# Patient Record
Sex: FEMALE | Race: BLACK OR AFRICAN AMERICAN | NOT HISPANIC OR LATINO
[De-identification: names, ages, dates, MRNs, and addresses within clinical notes are randomized per-mention and may not be internally consistent; named-entity substitution may affect disease eponyms.]

---

## 2017-05-23 ENCOUNTER — APPOINTMENT (OUTPATIENT)
Dept: GASTROENTEROLOGY | Facility: CLINIC | Age: 57
End: 2017-05-23

## 2017-05-23 VITALS
BODY MASS INDEX: 35.99 KG/M2 | WEIGHT: 216 LBS | RESPIRATION RATE: 14 BRPM | HEART RATE: 72 BPM | DIASTOLIC BLOOD PRESSURE: 88 MMHG | HEIGHT: 65 IN | OXYGEN SATURATION: 98 % | SYSTOLIC BLOOD PRESSURE: 160 MMHG | TEMPERATURE: 97.7 F

## 2017-05-23 DIAGNOSIS — R93.8 ABNORMAL FINDINGS ON DIAGNOSTIC IMAGING OF OTHER SPECIFIED BODY STRUCTURES: ICD-10-CM

## 2017-06-07 ENCOUNTER — RESULT REVIEW (OUTPATIENT)
Age: 57
End: 2017-06-07

## 2017-06-07 ENCOUNTER — OUTPATIENT (OUTPATIENT)
Dept: OUTPATIENT SERVICES | Facility: HOSPITAL | Age: 57
LOS: 1 days | Discharge: HOME | End: 2017-06-07
Payer: COMMERCIAL

## 2017-06-07 ENCOUNTER — APPOINTMENT (OUTPATIENT)
Dept: GASTROENTEROLOGY | Facility: HOSPITAL | Age: 57
End: 2017-06-07

## 2017-06-07 PROCEDURE — 43262 ENDO CHOLANGIOPANCREATOGRAPH: CPT | Mod: 59

## 2017-06-07 PROCEDURE — 43261 ENDO CHOLANGIOPANCREATOGRAPH: CPT

## 2017-06-07 PROCEDURE — 43274 ERCP DUCT STENT PLACEMENT: CPT

## 2017-06-07 PROCEDURE — 88104 CYTOPATH FL NONGYN SMEARS: CPT

## 2017-06-07 PROCEDURE — C1769: CPT

## 2017-06-07 PROCEDURE — 88305 TISSUE EXAM BY PATHOLOGIST: CPT

## 2017-06-07 PROCEDURE — C2617: CPT

## 2017-06-07 RX ORDER — INDOMETHACIN 50 MG
100 CAPSULE ORAL ONCE
Qty: 0 | Refills: 0 | Status: DISCONTINUED | OUTPATIENT
Start: 2017-06-07 | End: 2017-06-07

## 2017-06-09 LAB
NON-GYNECOLOGICAL CYTOLOGY STUDY: SIGNIFICANT CHANGE UP
SURGICAL PATHOLOGY STUDY: SIGNIFICANT CHANGE UP

## 2017-06-12 ENCOUNTER — EMERGENCY (EMERGENCY)
Facility: HOSPITAL | Age: 57
LOS: 1 days | Discharge: HOME | End: 2017-06-12
Attending: EMERGENCY MEDICINE | Admitting: EMERGENCY MEDICINE
Payer: COMMERCIAL

## 2017-06-12 VITALS
DIASTOLIC BLOOD PRESSURE: 65 MMHG | OXYGEN SATURATION: 98 % | HEART RATE: 82 BPM | SYSTOLIC BLOOD PRESSURE: 106 MMHG | RESPIRATION RATE: 18 BRPM | TEMPERATURE: 97 F

## 2017-06-12 VITALS
DIASTOLIC BLOOD PRESSURE: 86 MMHG | OXYGEN SATURATION: 97 % | RESPIRATION RATE: 16 BRPM | SYSTOLIC BLOOD PRESSURE: 122 MMHG | WEIGHT: 204.59 LBS | HEART RATE: 71 BPM | TEMPERATURE: 98 F

## 2017-06-12 DIAGNOSIS — R10.11 RIGHT UPPER QUADRANT PAIN: ICD-10-CM

## 2017-06-12 PROCEDURE — 74177 CT ABD & PELVIS W/CONTRAST: CPT

## 2017-06-12 PROCEDURE — 83690 ASSAY OF LIPASE: CPT

## 2017-06-12 PROCEDURE — 96374 THER/PROPH/DIAG INJ IV PUSH: CPT | Mod: XU

## 2017-06-12 PROCEDURE — 99284 EMERGENCY DEPT VISIT MOD MDM: CPT | Mod: 25

## 2017-06-12 PROCEDURE — 85025 COMPLETE CBC W/AUTO DIFF WBC: CPT

## 2017-06-12 PROCEDURE — 99285 EMERGENCY DEPT VISIT HI MDM: CPT

## 2017-06-12 PROCEDURE — 80053 COMPREHEN METABOLIC PANEL: CPT

## 2017-06-12 PROCEDURE — 83605 ASSAY OF LACTIC ACID: CPT

## 2017-06-12 PROCEDURE — 74177 CT ABD & PELVIS W/CONTRAST: CPT | Mod: 26

## 2017-06-12 RX ORDER — SODIUM CHLORIDE 9 MG/ML
1000 INJECTION INTRAMUSCULAR; INTRAVENOUS; SUBCUTANEOUS ONCE
Qty: 0 | Refills: 0 | Status: COMPLETED | OUTPATIENT
Start: 2017-06-12 | End: 2017-06-12

## 2017-06-12 RX ORDER — HYDROMORPHONE HYDROCHLORIDE 2 MG/ML
1 INJECTION INTRAMUSCULAR; INTRAVENOUS; SUBCUTANEOUS ONCE
Qty: 0 | Refills: 0 | Status: DISCONTINUED | OUTPATIENT
Start: 2017-06-12 | End: 2017-06-12

## 2017-06-12 RX ADMIN — SODIUM CHLORIDE 3000 MILLILITER(S): 9 INJECTION INTRAMUSCULAR; INTRAVENOUS; SUBCUTANEOUS at 12:44

## 2017-06-12 RX ADMIN — HYDROMORPHONE HYDROCHLORIDE 1 MILLIGRAM(S): 2 INJECTION INTRAMUSCULAR; INTRAVENOUS; SUBCUTANEOUS at 12:57

## 2017-06-12 RX ADMIN — HYDROMORPHONE HYDROCHLORIDE 1 MILLIGRAM(S): 2 INJECTION INTRAMUSCULAR; INTRAVENOUS; SUBCUTANEOUS at 12:59

## 2017-06-12 NOTE — CONSULT NOTE ADULT - ASSESSMENT
56 year old female s/p ERCP with stent placement on 6/8 presents with persistent abdominal pain. Pain unlikely 2/2 to pancreatitis given location and normal lipase. More likely due to stent stretching on stricture causing colicky pain.  Imaging studies pending.     -Lipase WNL   -Cytology/ pathology results appreciated  -IVF  -Diet as tolerated  -Anti-emetics and pain control as per primary team  -Follow up CT results.     GI following

## 2017-06-12 NOTE — ED ADULT TRIAGE NOTE - CHIEF COMPLAINT QUOTE
Pt w hx of abnormal MRCP and CBD stricture, s/p ERCP w stent plcmt on 6/7/17 c/p epigastric pain, nausea, decreased po intake, chills, denies fevers or vomiting. EKG obtained.

## 2017-06-12 NOTE — ED PROVIDER NOTE - MEDICAL DECISION MAKING DETAILS
PT with RUQ pain worsening since recent biliary stent placement.  Plan labs, CT, pain control and r/o complications.  SPoke with GI fellow will come see patient.

## 2017-06-12 NOTE — ED PROVIDER NOTE - PROGRESS NOTE DETAILS
ct results noted.  pt reassessed.  belly pain improved and belly soft minimal ttp RUQ.  spoke with gi fellow who spoke with dr. harrell who believes pain is expected post stent pain.  do not suspect pe, pna.  plan dc and outpt fu.

## 2017-06-12 NOTE — ED PROVIDER NOTE - OBJECTIVE STATEMENT
55 yo F presenting with RUQ pain persistent, sharp, nonradiating and worsening since recent biliary stent placed via ERCP last Wed.  Pt denies fever, chills, n/v/d, chest pain, cough or pleurisy.

## 2017-06-12 NOTE — ED ADULT NURSE NOTE - OBJECTIVE STATEMENT
55 y/o female w/ hx of biliary stents placed at [facility] this Wednesday 6/7 endoscopically c/o RUQ pain since the surgery. Patient reports tenderness and constant pain, worsening with PO intake and movement. Patient reports she has not been able to eat or had a BM since this surgery. Patient denies fevers, n/v/d. Patient not placed on abx after surgery. Denies abd distention.

## 2017-06-12 NOTE — CONSULT NOTE ADULT - SUBJECTIVE AND OBJECTIVE BOX
HPI: 56 year old female s/p ERCP 6/8 for distal CBD stricture s/p stent placement returns to ER for persistent abdominal pain. Pt states pain never resolved post procedurally and worsening in intensity over the weekend. Pain is a cramping aching pain in the RUQ, that is worse with meals. Pt denies denies vomiting but intermittent nausea. Was prescribed tramadol which did not alleviate pain. She denies decreased appetite, change in BMs, fevers or chills.       PAST MEDICAL & SURGICAL HISTORY:  -CBD stricture    MEDICATIONS  (PRN):      Allergies    No Known Allergies    Intolerances        SOCIAL HISTORY:    FAMILY HISTORY:      Vital Signs Last 24 Hrs  T(C): 36.8, Max: 36.8 (06-12 @ 12:07)  T(F): 98.2, Max: 98.2 (06-12 @ 12:07)  HR: 69 (69 - 71)  BP: 166/79 (122/86 - 166/79)  BP(mean): --  RR: 18 (16 - 18)  SpO2: 98% (97% - 98%)    PHYSICAL EXAM:    GEN: AOx3, NAD, answering questions approrpiately  HEENT: anicteric  CHEST: equal chest rise b/l  CVS: no m/r/g  ABD: obese, soft, TTP in RUQ (+moctez[place]) although distractable.         LABS:                        13.1   4.7   )-----------( 238      ( 12 Jun 2017 11:49 )             40.1     06-12    140  |  100  |  14  ----------------------------<  98  4.1   |  23  |  0.80    Ca    10.3      12 Jun 2017 11:49    TPro  7.8  /  Alb  4.2  /  TBili  0.7  /  DBili  x   /  AST  17  /  ALT  25  /  AlkPhos  59  06-12          RADIOLOGY & ADDITIONAL STUDIES:

## 2017-06-14 DIAGNOSIS — K83.1 OBSTRUCTION OF BILE DUCT: ICD-10-CM

## 2017-06-14 DIAGNOSIS — E66.9 OBESITY, UNSPECIFIED: ICD-10-CM

## 2017-08-08 ENCOUNTER — APPOINTMENT (OUTPATIENT)
Dept: GASTROENTEROLOGY | Facility: CLINIC | Age: 57
End: 2017-08-08
Payer: COMMERCIAL

## 2017-08-08 VITALS
WEIGHT: 203 LBS | TEMPERATURE: 98.1 F | HEIGHT: 65 IN | BODY MASS INDEX: 33.82 KG/M2 | SYSTOLIC BLOOD PRESSURE: 160 MMHG | HEART RATE: 58 BPM | RESPIRATION RATE: 12 BRPM | DIASTOLIC BLOOD PRESSURE: 90 MMHG | OXYGEN SATURATION: 98 %

## 2017-08-08 PROCEDURE — 99214 OFFICE O/P EST MOD 30 MIN: CPT

## 2017-08-21 ENCOUNTER — APPOINTMENT (OUTPATIENT)
Dept: GASTROENTEROLOGY | Facility: HOSPITAL | Age: 57
End: 2017-08-21

## 2017-09-25 ENCOUNTER — RESULT REVIEW (OUTPATIENT)
Age: 57
End: 2017-09-25

## 2017-09-25 ENCOUNTER — APPOINTMENT (OUTPATIENT)
Dept: GASTROENTEROLOGY | Facility: HOSPITAL | Age: 57
End: 2017-09-25

## 2017-09-25 ENCOUNTER — OUTPATIENT (OUTPATIENT)
Dept: OUTPATIENT SERVICES | Facility: HOSPITAL | Age: 57
LOS: 1 days | Discharge: HOME | End: 2017-09-25
Payer: COMMERCIAL

## 2017-09-25 PROCEDURE — 43261 ENDO CHOLANGIOPANCREATOGRAPH: CPT

## 2017-09-25 PROCEDURE — 43264 ERCP REMOVE DUCT CALCULI: CPT

## 2017-09-25 PROCEDURE — 43275 ERCP REMOVE FORGN BODY DUCT: CPT

## 2017-09-26 ENCOUNTER — RESULT REVIEW (OUTPATIENT)
Age: 57
End: 2017-09-26

## 2017-09-26 LAB — SURGICAL PATHOLOGY STUDY: SIGNIFICANT CHANGE UP

## 2017-09-26 PROCEDURE — 43261 ENDO CHOLANGIOPANCREATOGRAPH: CPT

## 2017-09-26 PROCEDURE — 88300 SURGICAL PATH GROSS: CPT

## 2017-09-26 PROCEDURE — C1889: CPT

## 2017-09-26 PROCEDURE — C1769: CPT

## 2017-09-26 PROCEDURE — 88305 TISSUE EXAM BY PATHOLOGIST: CPT

## 2017-09-26 PROCEDURE — 74328 X-RAY BILE DUCT ENDOSCOPY: CPT

## 2017-09-26 PROCEDURE — 43264 ERCP REMOVE DUCT CALCULI: CPT

## 2017-09-26 PROCEDURE — 88112 CYTOPATH CELL ENHANCE TECH: CPT

## 2017-09-26 PROCEDURE — 43275 ERCP REMOVE FORGN BODY DUCT: CPT

## 2017-09-27 LAB — SURGICAL PATHOLOGY STUDY: SIGNIFICANT CHANGE UP

## 2017-09-28 LAB — NON-GYNECOLOGICAL CYTOLOGY STUDY: SIGNIFICANT CHANGE UP

## 2017-09-29 DIAGNOSIS — Z72.0 TOBACCO USE: ICD-10-CM

## 2017-09-29 DIAGNOSIS — E66.9 OBESITY, UNSPECIFIED: ICD-10-CM

## 2017-09-29 DIAGNOSIS — K80.33 CALCULUS OF BILE DUCT WITH ACUTE CHOLANGITIS WITH OBSTRUCTION: ICD-10-CM

## 2018-05-22 ENCOUNTER — LABORATORY RESULT (OUTPATIENT)
Age: 58
End: 2018-05-22

## 2018-05-22 ENCOUNTER — APPOINTMENT (OUTPATIENT)
Dept: GASTROENTEROLOGY | Facility: CLINIC | Age: 58
End: 2018-05-22
Payer: COMMERCIAL

## 2018-05-22 VITALS
DIASTOLIC BLOOD PRESSURE: 90 MMHG | HEIGHT: 65 IN | WEIGHT: 211 LBS | TEMPERATURE: 97.7 F | BODY MASS INDEX: 35.16 KG/M2 | HEART RATE: 70 BPM | OXYGEN SATURATION: 99 % | RESPIRATION RATE: 14 BRPM | SYSTOLIC BLOOD PRESSURE: 143 MMHG

## 2018-05-22 PROCEDURE — 99215 OFFICE O/P EST HI 40 MIN: CPT | Mod: 25

## 2018-05-22 PROCEDURE — 36415 COLL VENOUS BLD VENIPUNCTURE: CPT

## 2018-05-23 LAB
ALBUMIN SERPL ELPH-MCNC: 4.2 G/DL
ALP BLD-CCNC: 62 U/L
ALT SERPL-CCNC: 29 U/L
ANION GAP SERPL CALC-SCNC: 14 MMOL/L
AST SERPL-CCNC: 26 U/L
BILIRUB SERPL-MCNC: 0.4 MG/DL
BUN SERPL-MCNC: 15 MG/DL
CALCIUM SERPL-MCNC: 10.3 MG/DL
CHLORIDE SERPL-SCNC: 105 MMOL/L
CO2 SERPL-SCNC: 25 MMOL/L
CREAT SERPL-MCNC: 1.02 MG/DL
GLUCOSE SERPL-MCNC: 79 MG/DL
POTASSIUM SERPL-SCNC: 4.4 MMOL/L
PROT SERPL-MCNC: 7.6 G/DL
SODIUM SERPL-SCNC: 144 MMOL/L

## 2019-04-02 ENCOUNTER — APPOINTMENT (OUTPATIENT)
Dept: GASTROENTEROLOGY | Facility: CLINIC | Age: 59
End: 2019-04-02
Payer: COMMERCIAL

## 2019-04-02 VITALS
HEART RATE: 69 BPM | RESPIRATION RATE: 14 BRPM | HEIGHT: 65 IN | BODY MASS INDEX: 36.49 KG/M2 | WEIGHT: 219 LBS | SYSTOLIC BLOOD PRESSURE: 140 MMHG | OXYGEN SATURATION: 98 % | DIASTOLIC BLOOD PRESSURE: 90 MMHG

## 2019-04-02 DIAGNOSIS — K63.89 OTHER SPECIFIED DISEASES OF INTESTINE: ICD-10-CM

## 2019-04-02 DIAGNOSIS — K83.1 OBSTRUCTION OF BILE DUCT: ICD-10-CM

## 2019-04-02 PROCEDURE — 99215 OFFICE O/P EST HI 40 MIN: CPT

## 2019-04-02 NOTE — PHYSICAL EXAM
[General Appearance - Alert] : alert [General Appearance - In No Acute Distress] : in no acute distress [Sclera] : the sclera and conjunctiva were normal [Hearing Threshold Finger Rub Not Falls] : hearing was normal [Neck Appearance] : the appearance of the neck was normal [Bowel Sounds] : normal bowel sounds [Abdomen Soft] : soft [Abdomen Tenderness] : non-tender [] : no hepato-splenomegaly [Abdomen Mass (___ Cm)] : no abdominal mass palpated [Abnormal Walk] : normal gait [Skin Color & Pigmentation] : normal skin color and pigmentation [Oriented To Time, Place, And Person] : oriented to person, place, and time [Impaired Insight] : insight and judgment were intact [Affect] : the affect was normal

## 2019-04-02 NOTE — ASSESSMENT
[FreeTextEntry1] : Ms. Rojas has recurrence of SIBO symptoms. This is not atypical. This is likely contributing to some of her difficulties with weight control. She has class II obesity and this will need to be addressed in a focused fashion. Will treat both problems simultaneously. \par \par Plan:\par \par 1. Rifaximin 550 mg twice dial for 14 days. VSL #3 to follow immediately after. \par 2. Refer to [facility]par 3. She will need a combination of anoretics and GLP2 modulators. Strongly consider endoscopic gastroplasty.

## 2019-04-02 NOTE — HISTORY OF PRESENT ILLNESS
[FreeTextEntry1] : Ms. Rojas returns to the GI clinic. Her symptoms had completely resolved following treatment for SIBO. She remained asymptomatic until one month ago. There is a complete recurrence of all of her symptoms including right sided abdominal pain, bloating and an overall sense of discomfort. Over the last year, her efforts for weight loss has not been successful and she is concerned about the health consequences.

## 2019-04-18 ENCOUNTER — APPOINTMENT (OUTPATIENT)
Dept: ENDOCRINOLOGY | Facility: CLINIC | Age: 59
End: 2019-04-18
Payer: COMMERCIAL

## 2019-04-18 VITALS — BODY MASS INDEX: 36.78 KG/M2 | WEIGHT: 221 LBS

## 2019-04-18 PROCEDURE — 97802 MEDICAL NUTRITION INDIV IN: CPT

## 2019-04-23 ENCOUNTER — APPOINTMENT (OUTPATIENT)
Dept: INTERNAL MEDICINE | Facility: CLINIC | Age: 59
End: 2019-04-23
Payer: COMMERCIAL

## 2019-04-23 PROCEDURE — 36415 COLL VENOUS BLD VENIPUNCTURE: CPT

## 2019-04-25 ENCOUNTER — APPOINTMENT (OUTPATIENT)
Dept: INTERNAL MEDICINE | Facility: CLINIC | Age: 59
End: 2019-04-25
Payer: COMMERCIAL

## 2019-04-25 ENCOUNTER — NON-APPOINTMENT (OUTPATIENT)
Age: 59
End: 2019-04-25

## 2019-04-25 VITALS
OXYGEN SATURATION: 97 % | HEART RATE: 74 BPM | WEIGHT: 219 LBS | SYSTOLIC BLOOD PRESSURE: 136 MMHG | DIASTOLIC BLOOD PRESSURE: 84 MMHG | BODY MASS INDEX: 36.49 KG/M2 | HEIGHT: 65 IN | TEMPERATURE: 97.8 F

## 2019-04-25 DIAGNOSIS — Z98.890 OTHER SPECIFIED POSTPROCEDURAL STATES: ICD-10-CM

## 2019-04-25 DIAGNOSIS — R06.00 DYSPNEA, UNSPECIFIED: ICD-10-CM

## 2019-04-25 DIAGNOSIS — R06.09 OTHER FORMS OF DYSPNEA: ICD-10-CM

## 2019-04-25 DIAGNOSIS — Z92.89 PERSONAL HISTORY OF OTHER MEDICAL TREATMENT: ICD-10-CM

## 2019-04-25 DIAGNOSIS — E66.09 OTHER OBESITY DUE TO EXCESS CALORIES: ICD-10-CM

## 2019-04-25 DIAGNOSIS — Z80.9 FAMILY HISTORY OF MALIGNANT NEOPLASM, UNSPECIFIED: ICD-10-CM

## 2019-04-25 DIAGNOSIS — Z83.1 FAMILY HISTORY OF OTHER INFECTIOUS AND PARASITIC DISEASES: ICD-10-CM

## 2019-04-25 DIAGNOSIS — Z87.891 PERSONAL HISTORY OF NICOTINE DEPENDENCE: ICD-10-CM

## 2019-04-25 LAB
25(OH)D3 SERPL-MCNC: 12 NG/ML
ALBUMIN SERPL ELPH-MCNC: 4.4 G/DL
ALP BLD-CCNC: 63 U/L
ALT SERPL-CCNC: 28 U/L
ANION GAP SERPL CALC-SCNC: 12 MMOL/L
APPEARANCE: CLEAR
AST SERPL-CCNC: 22 U/L
BASOPHILS # BLD AUTO: 0.02 K/UL
BASOPHILS NFR BLD AUTO: 0.4 %
BILIRUB SERPL-MCNC: 0.4 MG/DL
BILIRUBIN URINE: NEGATIVE
BLOOD URINE: NEGATIVE
BUN SERPL-MCNC: 11 MG/DL
CALCIUM SERPL-MCNC: 9.6 MG/DL
CHLORIDE SERPL-SCNC: 106 MMOL/L
CHOLEST SERPL-MCNC: 228 MG/DL
CHOLEST/HDLC SERPL: 3.6 RATIO
CO2 SERPL-SCNC: 26 MMOL/L
COLOR: YELLOW
CREAT SERPL-MCNC: 0.74 MG/DL
CYTOLOGY CVX/VAG DOC THIN PREP: NORMAL
EOSINOPHIL # BLD AUTO: 0.07 K/UL
EOSINOPHIL NFR BLD AUTO: 1.6 %
ESTIMATED AVERAGE GLUCOSE: 126 MG/DL
GLUCOSE QUALITATIVE U: NEGATIVE
GLUCOSE SERPL-MCNC: 102 MG/DL
HBA1C MFR BLD HPLC: 6 %
HCT VFR BLD CALC: 38.9 %
HCV AB SER QL: NONREACTIVE
HCV S/CO RATIO: 0.09 S/CO
HDLC SERPL-MCNC: 64 MG/DL
HGB BLD-MCNC: 11.8 G/DL
IMM GRANULOCYTES NFR BLD AUTO: 0.2 %
KETONES URINE: NEGATIVE
LDLC SERPL CALC-MCNC: 148 MG/DL
LEUKOCYTE ESTERASE URINE: NEGATIVE
LYMPHOCYTES # BLD AUTO: 1.43 K/UL
LYMPHOCYTES NFR BLD AUTO: 31.9 %
MAN DIFF?: NORMAL
MCHC RBC-ENTMCNC: 27.5 PG
MCHC RBC-ENTMCNC: 30.3 GM/DL
MCV RBC AUTO: 90.7 FL
MONOCYTES # BLD AUTO: 0.36 K/UL
MONOCYTES NFR BLD AUTO: 8 %
NEUTROPHILS # BLD AUTO: 2.59 K/UL
NEUTROPHILS NFR BLD AUTO: 57.9 %
NITRITE URINE: NEGATIVE
PH URINE: 5
PLATELET # BLD AUTO: 239 K/UL
POTASSIUM SERPL-SCNC: 4.4 MMOL/L
PROT SERPL-MCNC: 7.3 G/DL
PROTEIN URINE: NEGATIVE
RBC # BLD: 4.29 M/UL
RBC # FLD: 14.9 %
SODIUM SERPL-SCNC: 144 MMOL/L
SPECIFIC GRAVITY URINE: 1.02
T4 FREE SERPL-MCNC: 1.2 NG/DL
TRIGL SERPL-MCNC: 80 MG/DL
TSH SERPL-ACNC: 1.33 UIU/ML
UROBILINOGEN URINE: NORMAL
WBC # FLD AUTO: 4.48 K/UL

## 2019-04-25 PROCEDURE — 99386 PREV VISIT NEW AGE 40-64: CPT | Mod: 25

## 2019-04-25 PROCEDURE — 93000 ELECTROCARDIOGRAM COMPLETE: CPT

## 2019-04-25 RX ORDER — ALBUTEROL SULFATE 90 UG/1
108 (90 BASE) AEROSOL, METERED RESPIRATORY (INHALATION)
Qty: 1 | Refills: 1 | Status: ACTIVE | COMMUNITY
Start: 2018-03-19

## 2019-04-25 RX ORDER — DICYCLOMINE HYDROCHLORIDE 10 MG/1
10 CAPSULE ORAL
Qty: 20 | Refills: 0 | Status: COMPLETED | COMMUNITY
Start: 2018-10-26

## 2019-04-25 RX ORDER — TRAMADOL HYDROCHLORIDE 50 MG/1
50 TABLET, COATED ORAL
Qty: 15 | Refills: 0 | Status: DISCONTINUED | COMMUNITY
Start: 2017-06-07 | End: 2019-04-25

## 2019-04-25 NOTE — HISTORY OF PRESENT ILLNESS
[FreeTextEntry1] : annual exam [de-identified] : HTN\par - compliant w/ norvasc 10mg daily\par - BP well controlled\par - c/o intermittent HARLEY a/w substernal pressure\par - h/o normal stress test 5yrs ago\par \par Obesity\par - follows w/ nutritionist\par - tries to diet at home\par - enjoys fried chicken and rice\par - states her issue is eating very late at night after coming home from work\par - tires to eat salads and fish for the most part\par - has never tried any rx weight loss drugs\par \par PreDM2\par - new dx for pt\par \par dyspnea\par - uses Ventolin rarely, maybe 1/month if that\par - etio of SOB not that clear.\par \par SIBO\par - pending Xifaxan approval by insurance

## 2019-04-25 NOTE — HEALTH RISK ASSESSMENT
[No falls in past year] : Patient reported no falls in the past year [0] : 2) Feeling down, depressed, or hopeless: Not at all (0) [None] : None [Single] : single [Alone] : lives alone [Employed] : employed [Fully functional (bathing, dressing, toileting, transferring, walking, feeding)] : Fully functional (bathing, dressing, toileting, transferring, walking, feeding) [Fully functional (using the telephone, shopping, preparing meals, housekeeping, doing laundry, using] : Fully functional and needs no help or supervision to perform IADLs (using the telephone, shopping, preparing meals, housekeeping, doing laundry, using transportation, managing medications and managing finances) [With Patient/Caregiver] : With Patient/Caregiver [Designated Healthcare Proxy] : Designated healthcare proxy [[id number]] : 0 [] : No [Behavior] : denies difficulty with behavior [Language] : denies difficulty with language [Change in mental status noted] : No change in mental status noted [Learning/Retaining New Information] : denies difficulty learning/retaining new information [Handling Complex Tasks] : denies difficulty handling complex tasks [Reasoning] : denies difficulty with reasoning [Spatial Ability and Orientation] : denies difficulty with spatial ability and orientation [Sexually Active] : not sexually active [Reports changes in hearing] : Reports no changes in hearing [High Risk Behavior] : no high risk behavior [Reports changes in vision] : Reports no changes in vision [AdvancecareDate] : 04/25/2019

## 2019-04-25 NOTE — PHYSICAL EXAM
[No Acute Distress] : no acute distress [Well-Appearing] : well-appearing [Well Nourished] : well nourished [Well Developed] : well developed [Normal Sclera/Conjunctiva] : normal sclera/conjunctiva [Normal Outer Ear/Nose] : the outer ears and nose were normal in appearance [EOMI] : extraocular movements intact [Supple] : supple [No Lymphadenopathy] : no lymphadenopathy [No Respiratory Distress] : no respiratory distress  [Thyroid Normal, No Nodules] : the thyroid was normal and there were no nodules present [Clear to Auscultation] : lungs were clear to auscultation bilaterally [No Accessory Muscle Use] : no accessory muscle use [Normal Rate] : normal rate  [Regular Rhythm] : with a regular rhythm [No Murmur] : no murmur heard [Normal S1, S2] : normal S1 and S2 [No Varicosities] : no varicosities [No Edema] : there was no peripheral edema [No Extremity Clubbing/Cyanosis] : no extremity clubbing/cyanosis [Soft] : abdomen soft [Non Tender] : non-tender [Non-distended] : non-distended [No Masses] : no abdominal mass palpated [No HSM] : no HSM [Normal Supraclavicular Nodes] : no supraclavicular lymphadenopathy [No CVA Tenderness] : no CVA  tenderness [Normal Anterior Cervical Nodes] : no anterior cervical lymphadenopathy [No Joint Swelling] : no joint swelling [No Spinal Tenderness] : no spinal tenderness [Normal Gait] : normal gait [No Rash] : no rash [Grossly Normal Strength/Tone] : grossly normal strength/tone [No Focal Deficits] : no focal deficits [Coordination Grossly Intact] : coordination grossly intact [Normal Mood] : the mood was normal [Normal Affect] : the affect was normal [Alert and Oriented x3] : oriented to person, place, and time [Normal Insight/Judgement] : insight and judgment were intact

## 2019-05-14 ENCOUNTER — APPOINTMENT (OUTPATIENT)
Dept: GASTROENTEROLOGY | Facility: CLINIC | Age: 59
End: 2019-05-14

## 2019-05-23 ENCOUNTER — APPOINTMENT (OUTPATIENT)
Dept: HEART AND VASCULAR | Facility: CLINIC | Age: 59
End: 2019-05-23
Payer: COMMERCIAL

## 2019-05-23 VITALS
BODY MASS INDEX: 36.99 KG/M2 | SYSTOLIC BLOOD PRESSURE: 132 MMHG | OXYGEN SATURATION: 97 % | WEIGHT: 222 LBS | DIASTOLIC BLOOD PRESSURE: 84 MMHG | HEIGHT: 65 IN | HEART RATE: 73 BPM

## 2019-05-23 DIAGNOSIS — R07.9 CHEST PAIN, UNSPECIFIED: ICD-10-CM

## 2019-05-23 PROCEDURE — 99205 OFFICE O/P NEW HI 60 MIN: CPT

## 2019-05-23 RX ORDER — AMLODIPINE BESYLATE 10 MG/1
10 TABLET ORAL DAILY
Qty: 90 | Refills: 3 | Status: DISCONTINUED | COMMUNITY
Start: 2018-03-19 | End: 2019-05-23

## 2019-05-23 NOTE — HISTORY OF PRESENT ILLNESS
[FreeTextEntry1] : \par \par 57 y/o female with h/o htn, obesity, hl, predm who presents for initial evaluation today.\par \par HTN managed on norvasc - diagnosed 5 years\par \par Notes harley and cp\par HARLEY with stairs or long walks - started 4 years ago\par Has cp with exertion - pressure, substernal, 7/10, lasts minutes\par associated with nausea\par \par walks but not actively exercising\par \par Notes h/o snoring and fatigue\par \par \par PMH/PSH:\par htn\par obesity\par predm\par hl\par vit D deficiency\par small intestinal bact. overgrowth\par ercp/sphincterotomy\par lipoma removal\par h/o TB\par \par \par \par SH:\par former tobacco < 1/2 ppd x 25 years, quit \par no tobacco/drugs (former marijuana, cocaine - last used 10 years ago - prior use 7 years)\par social etoh\par lives alone\par single\par Mormon\par works for [employer]\par 6 sons healthy\par from [place]\par \par \par ALL:\par nkda\par \par \par MEDS:\par norvasc 10 mg qd\par ventolin prn\par \par \par \par FH:\par father -  TB 50's\par mother -  cancer 62\par 12 siblings - 3 \par \par

## 2019-05-23 NOTE — PHYSICAL EXAM
[General Appearance - Well Developed] : well developed [General Appearance - Well Nourished] : well nourished [General Appearance - In No Acute Distress] : no acute distress [Normal Conjunctiva] : the conjunctiva exhibited no abnormalities [Normal Oropharynx] : normal oropharynx [Normal Jugular Venous V Waves Present] : normal jugular venous V waves present [Heart Rate And Rhythm] : heart rate and rhythm were normal [Heart Sounds] : normal S1 and S2 [Murmurs] : no murmurs present [Arterial Pulses Normal] : the arterial pulses were normal [FreeTextEntry1] : 2+ ble edema [Respiration, Rhythm And Depth] : normal respiratory rhythm and effort [Auscultation Breath Sounds / Voice Sounds] : lungs were clear to auscultation bilaterally [Bowel Sounds] : normal bowel sounds [Abdomen Soft] : soft [Abdomen Tenderness] : non-tender [Abnormal Walk] : normal gait [Nail Clubbing] : no clubbing of the fingernails [Cyanosis, Localized] : no localized cyanosis [] : no rash [Skin Lesions] : no skin lesions [Oriented To Time, Place, And Person] : oriented to person, place, and time [No Anxiety] : not feeling anxious

## 2019-05-23 NOTE — DISCUSSION/SUMMARY
[Patient] : the patient [___ Week(s)] : [unfilled] week(s) [FreeTextEntry1] : \par 59 y/o female with h/o htn, obesity, hl, predm who presents for initial evaluation today of cp/laboy\par \par -change to norvasc/benazepril given le edema on higher dose norvasc\par -counseled on cvd risk factors, diet, exercise, weight loss\par -blood sugar management for predm\par -labs 2019 reviewed\par -ekg 2019 reviewed\par -ordered CTA and Echo\par -f/up 3 weeks for bp/bmp and urine microalbumin\par

## 2019-06-13 ENCOUNTER — RX RENEWAL (OUTPATIENT)
Age: 59
End: 2019-06-13

## 2019-07-02 ENCOUNTER — APPOINTMENT (OUTPATIENT)
Dept: CT IMAGING | Facility: HOSPITAL | Age: 59
End: 2019-07-02
Payer: COMMERCIAL

## 2019-07-02 ENCOUNTER — OUTPATIENT (OUTPATIENT)
Dept: OUTPATIENT SERVICES | Facility: HOSPITAL | Age: 59
LOS: 1 days | End: 2019-07-02
Payer: COMMERCIAL

## 2019-07-02 DIAGNOSIS — K76.0 FATTY (CHANGE OF) LIVER, NOT ELSEWHERE CLASSIFIED: ICD-10-CM

## 2019-07-02 PROCEDURE — 75574 CT ANGIO HRT W/3D IMAGE: CPT

## 2019-07-02 PROCEDURE — 75574 CT ANGIO HRT W/3D IMAGE: CPT | Mod: 26

## 2019-07-08 PROBLEM — K76.0 FATTY LIVER: Status: ACTIVE | Noted: 2019-07-08

## 2019-10-08 ENCOUNTER — APPOINTMENT (OUTPATIENT)
Dept: INTERNAL MEDICINE | Facility: CLINIC | Age: 59
End: 2019-10-08
Payer: COMMERCIAL

## 2019-10-08 ENCOUNTER — TRANSCRIPTION ENCOUNTER (OUTPATIENT)
Age: 59
End: 2019-10-08

## 2019-10-08 VITALS
HEIGHT: 65 IN | OXYGEN SATURATION: 96 % | DIASTOLIC BLOOD PRESSURE: 80 MMHG | BODY MASS INDEX: 36.65 KG/M2 | HEART RATE: 91 BPM | TEMPERATURE: 97.9 F | WEIGHT: 220 LBS | SYSTOLIC BLOOD PRESSURE: 128 MMHG

## 2019-10-08 DIAGNOSIS — M25.551 PAIN IN RIGHT HIP: ICD-10-CM

## 2019-10-08 PROCEDURE — 99214 OFFICE O/P EST MOD 30 MIN: CPT | Mod: 25

## 2019-10-08 PROCEDURE — 36415 COLL VENOUS BLD VENIPUNCTURE: CPT

## 2019-10-08 RX ORDER — IBUPROFEN 800 MG/1
800 TABLET, FILM COATED ORAL EVERY 6 HOURS
Qty: 120 | Refills: 0 | Status: ACTIVE | COMMUNITY
Start: 2018-09-26 | End: 1900-01-01

## 2019-10-08 NOTE — HISTORY OF PRESENT ILLNESS
[FreeTextEntry1] : right hip pain [de-identified] : right hip pain\par - radiates to low back/buttock and anterior thigh\par - denies heavy lfiting\par - pain is constant, dull, not electric\par \par obesity\par - was unable to  contrave, was too expensive, did not know how to find coupon\par \par HTN\par - much better afer medication changed to Norvasc/benazepril\par - never followed up w/ cardio for Cr check

## 2019-10-08 NOTE — PHYSICAL EXAM
[No Acute Distress] : no acute distress [Well Developed] : well developed [Well-Appearing] : well-appearing [Well Nourished] : well nourished [No Respiratory Distress] : no respiratory distress  [No Carotid Bruits] : no carotid bruits [No Abdominal Bruit] : a ~M bruit was not heard ~T in the abdomen [No Varicosities] : no varicosities [Pedal Pulses Present] : the pedal pulses are present [No Edema] : there was no peripheral edema [No Extremity Clubbing/Cyanosis] : no extremity clubbing/cyanosis [Non Tender] : non-tender [Soft] : abdomen soft [Non-distended] : non-distended [No Masses] : no abdominal mass palpated [No HSM] : no HSM [No Rash] : no rash [No Focal Deficits] : no focal deficits [Coordination Grossly Intact] : coordination grossly intact [Normal Gait] : normal gait [Normal Affect] : the affect was normal [Normal Mood] : the mood was normal [Alert and Oriented x3] : oriented to person, place, and time [Normal Insight/Judgement] : insight and judgment were intact [Normal Sclera/Conjunctiva] : normal sclera/conjunctiva [EOMI] : extraocular movements intact [No Spinal Tenderness] : no spinal tenderness [de-identified] : right hip tenderness to deep palpation.

## 2019-10-09 ENCOUNTER — MEDICATION RENEWAL (OUTPATIENT)
Age: 59
End: 2019-10-09

## 2019-10-09 RX ORDER — ERGOCALCIFEROL 1.25 MG/1
1.25 MG CAPSULE, LIQUID FILLED ORAL
Qty: 12 | Refills: 3 | Status: ACTIVE | COMMUNITY
Start: 2019-04-25 | End: 1900-01-01

## 2019-10-17 ENCOUNTER — TRANSCRIPTION ENCOUNTER (OUTPATIENT)
Age: 59
End: 2019-10-17

## 2019-10-17 LAB
25(OH)D3 SERPL-MCNC: 15.3 NG/ML
ALBUMIN SERPL ELPH-MCNC: 4.7 G/DL
ALP BLD-CCNC: 70 U/L
ALT SERPL-CCNC: 30 U/L
ANION GAP SERPL CALC-SCNC: 14 MMOL/L
APPEARANCE: CLEAR
AST SERPL-CCNC: 23 U/L
BASOPHILS # BLD AUTO: 0.02 K/UL
BASOPHILS NFR BLD AUTO: 0.4 %
BILIRUB SERPL-MCNC: 0.5 MG/DL
BILIRUBIN URINE: NEGATIVE
BLOOD URINE: NEGATIVE
BUN SERPL-MCNC: 14 MG/DL
CALCIUM SERPL-MCNC: 10.2 MG/DL
CHLORIDE SERPL-SCNC: 104 MMOL/L
CHOLEST SERPL-MCNC: 243 MG/DL
CHOLEST/HDLC SERPL: 3.5 RATIO
CO2 SERPL-SCNC: 24 MMOL/L
COLOR: YELLOW
CREAT SERPL-MCNC: 0.75 MG/DL
EOSINOPHIL # BLD AUTO: 0.07 K/UL
EOSINOPHIL NFR BLD AUTO: 1.3 %
ESTIMATED AVERAGE GLUCOSE: 126 MG/DL
GLUCOSE QUALITATIVE U: NEGATIVE
GLUCOSE SERPL-MCNC: 145 MG/DL
HBA1C MFR BLD HPLC: 6 %
HCT VFR BLD CALC: 38.8 %
HDLC SERPL-MCNC: 70 MG/DL
HGB BLD-MCNC: 12.1 G/DL
IMM GRANULOCYTES NFR BLD AUTO: 0.6 %
KETONES URINE: NEGATIVE
LDLC SERPL CALC-MCNC: 150 MG/DL
LEUKOCYTE ESTERASE URINE: NEGATIVE
LYMPHOCYTES # BLD AUTO: 1.87 K/UL
LYMPHOCYTES NFR BLD AUTO: 35.6 %
MAN DIFF?: NORMAL
MCHC RBC-ENTMCNC: 27.6 PG
MCHC RBC-ENTMCNC: 31.2 GM/DL
MCV RBC AUTO: 88.6 FL
MONOCYTES # BLD AUTO: 0.19 K/UL
MONOCYTES NFR BLD AUTO: 3.6 %
NEUTROPHILS # BLD AUTO: 3.07 K/UL
NEUTROPHILS NFR BLD AUTO: 58.5 %
NITRITE URINE: NEGATIVE
PH URINE: 5
PLATELET # BLD AUTO: 235 K/UL
POTASSIUM SERPL-SCNC: 3.8 MMOL/L
PROT SERPL-MCNC: 7.2 G/DL
PROTEIN URINE: NEGATIVE
RBC # BLD: 4.38 M/UL
RBC # FLD: 14.4 %
SODIUM SERPL-SCNC: 142 MMOL/L
SPECIFIC GRAVITY URINE: 1.03
TRIGL SERPL-MCNC: 117 MG/DL
UROBILINOGEN URINE: NORMAL
WBC # FLD AUTO: 5.25 K/UL

## 2020-02-03 ENCOUNTER — APPOINTMENT (OUTPATIENT)
Dept: INTERNAL MEDICINE | Facility: CLINIC | Age: 60
End: 2020-02-03
Payer: COMMERCIAL

## 2020-02-03 VITALS
HEIGHT: 65 IN | SYSTOLIC BLOOD PRESSURE: 130 MMHG | OXYGEN SATURATION: 98 % | WEIGHT: 212 LBS | TEMPERATURE: 98.1 F | HEART RATE: 69 BPM | DIASTOLIC BLOOD PRESSURE: 80 MMHG | BODY MASS INDEX: 35.32 KG/M2

## 2020-02-03 DIAGNOSIS — K52.9 NONINFECTIVE GASTROENTERITIS AND COLITIS, UNSPECIFIED: ICD-10-CM

## 2020-02-03 PROCEDURE — 99214 OFFICE O/P EST MOD 30 MIN: CPT | Mod: 25

## 2020-02-03 PROCEDURE — 36415 COLL VENOUS BLD VENIPUNCTURE: CPT

## 2020-02-03 NOTE — PHYSICAL EXAM
[Normal] : affect was normal and insight and judgment were intact [de-identified] : soft, mild tenderness to deep palpation.

## 2020-02-03 NOTE — HISTORY OF PRESENT ILLNESS
[FreeTextEntry8] : diarrhea\par - 2 weeks of profuse watery diarrhea\par - previously BM every 15mins, now down to twice a day\par - no blood or mucus\par - a/w diffuse abdominal cramping and discomfort\par - feels similar to last year in april when she had SIBO and was treated by GI w/ Xifaxan\par - cramping is a/w diffuse body aches and fatigue as if "she was beat up"\par \par Obesity/DM2\par - unsure why rx for victoza was never received. \par - states she is eating healthier now.

## 2020-02-07 ENCOUNTER — TRANSCRIPTION ENCOUNTER (OUTPATIENT)
Age: 60
End: 2020-02-07

## 2020-02-07 LAB
ALBUMIN SERPL ELPH-MCNC: 4.8 G/DL
ALP BLD-CCNC: 69 U/L
ALT SERPL-CCNC: 28 U/L
AMYLASE/CREAT SERPL: 68 U/L
ANION GAP SERPL CALC-SCNC: 11 MMOL/L
AST SERPL-CCNC: 22 U/L
BASOPHILS # BLD AUTO: 0.02 K/UL
BASOPHILS NFR BLD AUTO: 0.4 %
BILIRUB SERPL-MCNC: 0.3 MG/DL
BUN SERPL-MCNC: 14 MG/DL
CALCIUM SERPL-MCNC: 9.7 MG/DL
CHLORIDE SERPL-SCNC: 109 MMOL/L
CO2 SERPL-SCNC: 26 MMOL/L
CREAT SERPL-MCNC: 0.79 MG/DL
CREAT SPEC-SCNC: 141 MG/DL
CRP SERPL-MCNC: 0.71 MG/DL
EOSINOPHIL # BLD AUTO: 0.06 K/UL
EOSINOPHIL NFR BLD AUTO: 1.2 %
ESTIMATED AVERAGE GLUCOSE: 123 MG/DL
GLUCOSE SERPL-MCNC: 86 MG/DL
HBA1C MFR BLD HPLC: 5.9 %
HCT VFR BLD CALC: 39 %
HGB BLD-MCNC: 12 G/DL
IMM GRANULOCYTES NFR BLD AUTO: 0.2 %
LPL SERPL-CCNC: 29 U/L
LYMPHOCYTES # BLD AUTO: 1.65 K/UL
LYMPHOCYTES NFR BLD AUTO: 34.1 %
MAN DIFF?: NORMAL
MCHC RBC-ENTMCNC: 27.8 PG
MCHC RBC-ENTMCNC: 30.8 GM/DL
MCV RBC AUTO: 90.3 FL
MICROALBUMIN 24H UR DL<=1MG/L-MCNC: <1.2 MG/DL
MICROALBUMIN/CREAT 24H UR-RTO: NORMAL MG/G
MONOCYTES # BLD AUTO: 0.39 K/UL
MONOCYTES NFR BLD AUTO: 8.1 %
NEUTROPHILS # BLD AUTO: 2.71 K/UL
NEUTROPHILS NFR BLD AUTO: 56 %
PLATELET # BLD AUTO: 232 K/UL
POTASSIUM SERPL-SCNC: 3.9 MMOL/L
PROT SERPL-MCNC: 7.3 G/DL
RBC # BLD: 4.32 M/UL
RBC # FLD: 14.6 %
SODIUM SERPL-SCNC: 146 MMOL/L
WBC # FLD AUTO: 4.84 K/UL

## 2020-02-10 ENCOUNTER — TRANSCRIPTION ENCOUNTER (OUTPATIENT)
Age: 60
End: 2020-02-10

## 2020-02-10 RX ORDER — NALTREXONE HYDROCHLORIDE AND BUPROPION HYDROCHLORIDE 8; 90 MG/1; MG/1
8-90 TABLET, EXTENDED RELEASE ORAL
Qty: 120 | Refills: 1 | Status: DISCONTINUED | COMMUNITY
Start: 2019-04-25 | End: 2020-02-10

## 2020-02-13 ENCOUNTER — TRANSCRIPTION ENCOUNTER (OUTPATIENT)
Age: 60
End: 2020-02-13

## 2020-03-19 ENCOUNTER — RX RENEWAL (OUTPATIENT)
Age: 60
End: 2020-03-19

## 2020-04-13 ENCOUNTER — TRANSCRIPTION ENCOUNTER (OUTPATIENT)
Age: 60
End: 2020-04-13

## 2020-04-14 ENCOUNTER — RX RENEWAL (OUTPATIENT)
Age: 60
End: 2020-04-14

## 2020-04-15 ENCOUNTER — APPOINTMENT (OUTPATIENT)
Dept: INTERNAL MEDICINE | Facility: CLINIC | Age: 60
End: 2020-04-15
Payer: COMMERCIAL

## 2020-04-15 ENCOUNTER — LABORATORY RESULT (OUTPATIENT)
Age: 60
End: 2020-04-15

## 2020-04-15 VITALS — DIASTOLIC BLOOD PRESSURE: 80 MMHG | SYSTOLIC BLOOD PRESSURE: 120 MMHG | TEMPERATURE: 100.4 F

## 2020-04-15 DIAGNOSIS — M79.10 MYALGIA, UNSPECIFIED SITE: ICD-10-CM

## 2020-04-15 DIAGNOSIS — R50.9 FEVER, UNSPECIFIED: ICD-10-CM

## 2020-04-15 DIAGNOSIS — R10.9 UNSPECIFIED ABDOMINAL PAIN: ICD-10-CM

## 2020-04-15 PROCEDURE — 99214 OFFICE O/P EST MOD 30 MIN: CPT | Mod: 25

## 2020-04-15 PROCEDURE — 36415 COLL VENOUS BLD VENIPUNCTURE: CPT

## 2020-04-15 RX ORDER — RIFAXIMIN 550 MG/1
550 TABLET ORAL
Qty: 28 | Refills: 0 | Status: COMPLETED | COMMUNITY
Start: 2019-04-02 | End: 2020-04-15

## 2020-04-15 NOTE — HISTORY OF PRESENT ILLNESS
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

## 2020-04-15 NOTE — PHYSICAL EXAM
[Normal] : affect was normal and insight and judgment were intact [de-identified] : mild right flank tenderness [de-identified] : mild right CVAT

## 2020-04-16 LAB
ALBUMIN SERPL ELPH-MCNC: 4.9 G/DL
ALP BLD-CCNC: 61 U/L
ALT SERPL-CCNC: 27 U/L
ANION GAP SERPL CALC-SCNC: 16 MMOL/L
APPEARANCE: ABNORMAL
AST SERPL-CCNC: 28 U/L
BASOPHILS # BLD AUTO: 0.01 K/UL
BASOPHILS NFR BLD AUTO: 0.2 %
BILIRUB SERPL-MCNC: 0.5 MG/DL
BILIRUBIN URINE: NEGATIVE
BLOOD URINE: NORMAL
BUN SERPL-MCNC: 11 MG/DL
CALCIUM SERPL-MCNC: 9.3 MG/DL
CHLORIDE SERPL-SCNC: 104 MMOL/L
CK SERPL-CCNC: 317 U/L
CO2 SERPL-SCNC: 23 MMOL/L
COLOR: YELLOW
CREAT SERPL-MCNC: 0.9 MG/DL
CRP SERPL-MCNC: 0.77 MG/DL
DEPRECATED D DIMER PPP IA-ACNC: 181 NG/ML DDU
EOSINOPHIL # BLD AUTO: 0 K/UL
EOSINOPHIL NFR BLD AUTO: 0 %
ERYTHROCYTE [SEDIMENTATION RATE] IN BLOOD BY WESTERGREN METHOD: 57 MM/HR
GLUCOSE QUALITATIVE U: NEGATIVE
GLUCOSE SERPL-MCNC: 117 MG/DL
HCT VFR BLD CALC: 41.6 %
HGB BLD-MCNC: 12.7 G/DL
IMM GRANULOCYTES NFR BLD AUTO: 0.2 %
KETONES URINE: NEGATIVE
LEUKOCYTE ESTERASE URINE: NEGATIVE
LYMPHOCYTES # BLD AUTO: 1.35 K/UL
LYMPHOCYTES NFR BLD AUTO: 28.9 %
MAGNESIUM SERPL-MCNC: 1.9 MG/DL
MAN DIFF?: NORMAL
MCHC RBC-ENTMCNC: 26.7 PG
MCHC RBC-ENTMCNC: 30.5 GM/DL
MCV RBC AUTO: 87.4 FL
MONOCYTES # BLD AUTO: 0.31 K/UL
MONOCYTES NFR BLD AUTO: 6.6 %
NEUTROPHILS # BLD AUTO: 2.99 K/UL
NEUTROPHILS NFR BLD AUTO: 64.1 %
NITRITE URINE: NEGATIVE
PH URINE: 5.5
PLATELET # BLD AUTO: 201 K/UL
POTASSIUM SERPL-SCNC: 3.8 MMOL/L
PROT SERPL-MCNC: 7.7 G/DL
PROTEIN URINE: ABNORMAL
RBC # BLD: 4.76 M/UL
RBC # FLD: 14.4 %
SODIUM SERPL-SCNC: 143 MMOL/L
SPECIFIC GRAVITY URINE: 1.03
UROBILINOGEN URINE: NORMAL
WBC # FLD AUTO: 4.67 K/UL

## 2020-07-06 ENCOUNTER — TRANSCRIPTION ENCOUNTER (OUTPATIENT)
Age: 60
End: 2020-07-06

## 2020-07-07 ENCOUNTER — TRANSCRIPTION ENCOUNTER (OUTPATIENT)
Age: 60
End: 2020-07-07

## 2020-12-31 PROBLEM — K63.89 SMALL INTESTINAL BACTERIAL OVERGROWTH: Status: ACTIVE | Noted: 2019-04-02

## 2021-05-18 ENCOUNTER — NON-APPOINTMENT (OUTPATIENT)
Age: 61
End: 2021-05-18

## 2021-05-18 ENCOUNTER — APPOINTMENT (OUTPATIENT)
Dept: INTERNAL MEDICINE | Facility: CLINIC | Age: 61
End: 2021-05-18
Payer: COMMERCIAL

## 2021-05-18 VITALS
HEART RATE: 65 BPM | SYSTOLIC BLOOD PRESSURE: 139 MMHG | OXYGEN SATURATION: 97 % | TEMPERATURE: 98.4 F | WEIGHT: 217 LBS | BODY MASS INDEX: 36.11 KG/M2 | DIASTOLIC BLOOD PRESSURE: 84 MMHG

## 2021-05-18 DIAGNOSIS — I10 ESSENTIAL (PRIMARY) HYPERTENSION: ICD-10-CM

## 2021-05-18 DIAGNOSIS — R73.03 PREDIABETES.: ICD-10-CM

## 2021-05-18 DIAGNOSIS — E78.5 HYPERLIPIDEMIA, UNSPECIFIED: ICD-10-CM

## 2021-05-18 DIAGNOSIS — R51.9 HEADACHE, UNSPECIFIED: ICD-10-CM

## 2021-05-18 DIAGNOSIS — Z00.00 ENCOUNTER FOR GENERAL ADULT MEDICAL EXAMINATION W/OUT ABNORMAL FINDINGS: ICD-10-CM

## 2021-05-18 DIAGNOSIS — E55.9 VITAMIN D DEFICIENCY, UNSPECIFIED: ICD-10-CM

## 2021-05-18 DIAGNOSIS — R14.0 ABDOMINAL DISTENSION (GASEOUS): ICD-10-CM

## 2021-05-18 DIAGNOSIS — E11.9 TYPE 2 DIABETES MELLITUS W/OUT COMPLICATIONS: ICD-10-CM

## 2021-05-18 PROCEDURE — 93000 ELECTROCARDIOGRAM COMPLETE: CPT

## 2021-05-18 PROCEDURE — 99072 ADDL SUPL MATRL&STAF TM PHE: CPT

## 2021-05-18 PROCEDURE — 36415 COLL VENOUS BLD VENIPUNCTURE: CPT

## 2021-05-18 PROCEDURE — 99396 PREV VISIT EST AGE 40-64: CPT | Mod: 25

## 2021-05-18 RX ORDER — PEN NEEDLE, DIABETIC 29 G X1/2"
32G X 4 MM NEEDLE, DISPOSABLE MISCELLANEOUS
Qty: 1 | Refills: 3 | Status: ACTIVE | COMMUNITY
Start: 2020-02-13

## 2021-05-18 RX ORDER — ATORVASTATIN CALCIUM 40 MG/1
40 TABLET, FILM COATED ORAL
Qty: 90 | Refills: 3 | Status: DISCONTINUED | COMMUNITY
Start: 2020-02-10 | End: 2021-05-18

## 2021-05-18 NOTE — HISTORY OF PRESENT ILLNESS
[FreeTextEntry1] : annual exam [de-identified] : annual\par - last visit over 1 year ago\par - missed b/c of covid pandemic\par \par HA\par - chronic issue since 4/2020\par - constant dull\par - mildly improved w/ OTC Tylenol\par - never had MRI brain for eval\par \par HTN\par - compliant w/ amlodipine/benazapril 5/10mg daily\par \par DM2\par - has not been on any medication for months\par \par HCM\par - needs mammo\par -

## 2021-05-18 NOTE — PHYSICAL EXAM
[No Acute Distress] : no acute distress [Well Nourished] : well nourished [Well Developed] : well developed [Well-Appearing] : well-appearing [Normal] : no acute distress, well nourished, well developed and well-appearing [Normal Sclera/Conjunctiva] : normal sclera/conjunctiva [EOMI] : extraocular movements intact [Normal Outer Ear/Nose] : the outer ears and nose were normal in appearance [No Respiratory Distress] : no respiratory distress  [No Accessory Muscle Use] : no accessory muscle use [Clear to Auscultation] : lungs were clear to auscultation bilaterally [Normal Rate] : normal rate  [Regular Rhythm] : with a regular rhythm [Normal S1, S2] : normal S1 and S2 [No Murmur] : no murmur heard [No Edema] : there was no peripheral edema [No Extremity Clubbing/Cyanosis] : no extremity clubbing/cyanosis [Soft] : abdomen soft [Non Tender] : non-tender [Non-distended] : non-distended [No Masses] : no abdominal mass palpated [No HSM] : no HSM [No Joint Swelling] : no joint swelling [Grossly Normal Strength/Tone] : grossly normal strength/tone [No Rash] : no rash [Coordination Grossly Intact] : coordination grossly intact [No Focal Deficits] : no focal deficits [Normal Gait] : normal gait [Normal Affect] : the affect was normal [Alert and Oriented x3] : oriented to person, place, and time [Normal Mood] : the mood was normal [Normal Insight/Judgement] : insight and judgment were intact

## 2021-06-10 LAB
25(OH)D3 SERPL-MCNC: 26.7 NG/ML
ALBUMIN SERPL ELPH-MCNC: 4.7 G/DL
ALP BLD-CCNC: 67 U/L
ALT SERPL-CCNC: 28 U/L
ANION GAP SERPL CALC-SCNC: 14 MMOL/L
APPEARANCE: CLEAR
AST SERPL-CCNC: 21 U/L
BASOPHILS # BLD AUTO: 0.01 K/UL
BASOPHILS NFR BLD AUTO: 0.2 %
BILIRUB SERPL-MCNC: 0.5 MG/DL
BILIRUBIN URINE: NEGATIVE
BLOOD URINE: NEGATIVE
BUN SERPL-MCNC: 14 MG/DL
CALCIUM SERPL-MCNC: 9.9 MG/DL
CHLORIDE SERPL-SCNC: 105 MMOL/L
CHOLEST SERPL-MCNC: 220 MG/DL
CK SERPL-CCNC: 297 U/L
CO2 SERPL-SCNC: 22 MMOL/L
COLOR: NORMAL
CREAT SERPL-MCNC: 0.78 MG/DL
EOSINOPHIL # BLD AUTO: 0.03 K/UL
EOSINOPHIL NFR BLD AUTO: 0.6 %
ESTIMATED AVERAGE GLUCOSE: 128 MG/DL
GLUCOSE QUALITATIVE U: NEGATIVE
GLUCOSE SERPL-MCNC: 84 MG/DL
HBA1C MFR BLD HPLC: 6.1 %
HCT VFR BLD CALC: 37.8 %
HDLC SERPL-MCNC: 67 MG/DL
HGB BLD-MCNC: 11.2 G/DL
IMM GRANULOCYTES NFR BLD AUTO: 0.4 %
KETONES URINE: NEGATIVE
LDLC SERPL CALC-MCNC: 138 MG/DL
LEUKOCYTE ESTERASE URINE: NEGATIVE
LYMPHOCYTES # BLD AUTO: 1.78 K/UL
LYMPHOCYTES NFR BLD AUTO: 36.3 %
MAGNESIUM SERPL-MCNC: 2.3 MG/DL
MAN DIFF?: NORMAL
MCHC RBC-ENTMCNC: 26.9 PG
MCHC RBC-ENTMCNC: 29.6 GM/DL
MCV RBC AUTO: 90.9 FL
MONOCYTES # BLD AUTO: 0.42 K/UL
MONOCYTES NFR BLD AUTO: 8.6 %
NEUTROPHILS # BLD AUTO: 2.65 K/UL
NEUTROPHILS NFR BLD AUTO: 53.9 %
NITRITE URINE: NEGATIVE
NONHDLC SERPL-MCNC: 153 MG/DL
PH URINE: 5.5
PLATELET # BLD AUTO: 235 K/UL
POTASSIUM SERPL-SCNC: 4.2 MMOL/L
PROT SERPL-MCNC: 7.5 G/DL
PROTEIN URINE: NEGATIVE
RBC # BLD: 4.16 M/UL
RBC # FLD: 15.1 %
SODIUM SERPL-SCNC: 141 MMOL/L
SPECIFIC GRAVITY URINE: 1.02
TRIGL SERPL-MCNC: 77 MG/DL
TSH SERPL-ACNC: 1.1 UIU/ML
UROBILINOGEN URINE: NORMAL
WBC # FLD AUTO: 4.91 K/UL

## 2021-06-25 ENCOUNTER — NON-APPOINTMENT (OUTPATIENT)
Age: 61
End: 2021-06-25

## 2021-08-17 ENCOUNTER — RX RENEWAL (OUTPATIENT)
Age: 61
End: 2021-08-17

## 2021-09-21 ENCOUNTER — RX RENEWAL (OUTPATIENT)
Age: 61
End: 2021-09-21

## 2021-10-25 ENCOUNTER — TRANSCRIPTION ENCOUNTER (OUTPATIENT)
Age: 61
End: 2021-10-25

## 2021-10-25 RX ORDER — AMLODIPINE BESYLATE AND BENAZEPRIL HYDROCHLORIDE 5; 10 MG/1; MG/1
5-10 CAPSULE ORAL
Qty: 30 | Refills: 6 | Status: ACTIVE | COMMUNITY
Start: 2019-05-23 | End: 1900-01-01

## 2021-11-11 ENCOUNTER — RX RENEWAL (OUTPATIENT)
Age: 61
End: 2021-11-11

## 2021-12-20 ENCOUNTER — RX RENEWAL (OUTPATIENT)
Age: 61
End: 2021-12-20

## 2021-12-20 RX ORDER — LIRAGLUTIDE 6 MG/ML
18 INJECTION SUBCUTANEOUS DAILY
Qty: 6 | Refills: 4 | Status: ACTIVE | COMMUNITY
Start: 2019-10-17 | End: 1900-01-01

## 2022-01-05 ENCOUNTER — LABORATORY RESULT (OUTPATIENT)
Age: 62
End: 2022-01-05

## 2022-01-05 ENCOUNTER — APPOINTMENT (OUTPATIENT)
Dept: GASTROENTEROLOGY | Facility: CLINIC | Age: 62
End: 2022-01-05
Payer: COMMERCIAL

## 2022-01-05 VITALS
HEIGHT: 65 IN | RESPIRATION RATE: 15 BRPM | HEART RATE: 82 BPM | OXYGEN SATURATION: 98 % | SYSTOLIC BLOOD PRESSURE: 140 MMHG | WEIGHT: 224 LBS | TEMPERATURE: 97.6 F | BODY MASS INDEX: 37.32 KG/M2 | DIASTOLIC BLOOD PRESSURE: 80 MMHG

## 2022-01-05 PROCEDURE — 36415 COLL VENOUS BLD VENIPUNCTURE: CPT

## 2022-01-05 PROCEDURE — 99215 OFFICE O/P EST HI 40 MIN: CPT | Mod: 25

## 2022-01-05 PROCEDURE — 99205 OFFICE O/P NEW HI 60 MIN: CPT | Mod: 25

## 2022-01-06 ENCOUNTER — TRANSCRIPTION ENCOUNTER (OUTPATIENT)
Age: 62
End: 2022-01-06

## 2022-01-06 LAB
25(OH)D3 SERPL-MCNC: 22.8 NG/ML
ALBUMIN SERPL ELPH-MCNC: 4.7 G/DL
ALP BLD-CCNC: 68 U/L
ALT SERPL-CCNC: 30 U/L
ANA SER IF-ACNC: NEGATIVE
ANION GAP SERPL CALC-SCNC: 13 MMOL/L
AST SERPL-CCNC: 23 U/L
BASOPHILS # BLD AUTO: 0.02 K/UL
BASOPHILS NFR BLD AUTO: 0.4 %
BILIRUB DIRECT SERPL-MCNC: 0.1 MG/DL
BILIRUB SERPL-MCNC: 0.4 MG/DL
BUN SERPL-MCNC: 12 MG/DL
CALCIUM SERPL-MCNC: 9.5 MG/DL
CHLORIDE SERPL-SCNC: 106 MMOL/L
CO2 SERPL-SCNC: 24 MMOL/L
CREAT SERPL-MCNC: 0.87 MG/DL
EOSINOPHIL # BLD AUTO: 0.04 K/UL
EOSINOPHIL NFR BLD AUTO: 0.9 %
GLUCOSE SERPL-MCNC: 120 MG/DL
HCT VFR BLD CALC: 38.3 %
HGB BLD-MCNC: 11.7 G/DL
IMM GRANULOCYTES NFR BLD AUTO: 0.2 %
INR PPP: 0.96 RATIO
LPL SERPL-CCNC: 34 U/L
LYMPHOCYTES # BLD AUTO: 1.54 K/UL
LYMPHOCYTES NFR BLD AUTO: 34.3 %
MAN DIFF?: NORMAL
MCHC RBC-ENTMCNC: 27.3 PG
MCHC RBC-ENTMCNC: 30.5 GM/DL
MCV RBC AUTO: 89.5 FL
MONOCYTES # BLD AUTO: 0.25 K/UL
MONOCYTES NFR BLD AUTO: 5.6 %
NEUTROPHILS # BLD AUTO: 2.63 K/UL
NEUTROPHILS NFR BLD AUTO: 58.6 %
PLATELET # BLD AUTO: 218 K/UL
POTASSIUM SERPL-SCNC: 4 MMOL/L
PROT SERPL-MCNC: 7.4 G/DL
PT BLD: 11.4 SEC
RBC # BLD: 4.28 M/UL
RBC # FLD: 14.1 %
SODIUM SERPL-SCNC: 143 MMOL/L
WBC # FLD AUTO: 4.49 K/UL

## 2022-01-07 LAB
CELIACPAN: NORMAL
MITOCHONDRIA AB SER IF-ACNC: NORMAL

## 2022-01-13 NOTE — HISTORY OF PRESENT ILLNESS
[de-identified] : 60 yo F PMH HTN, DM, history of an ERCP with CBD stenting for benign CBD stricture with Dr. Gutierrez in 2017 who presents for evaluation of nausea, abdominal cramping and symptoms similar to when she required an ERCP in 2017. \par \par Patient lost her son recently She has 4 other sons\par \par She had ERCP with CBD stent for bening CBD stricture with Dr. Gutierrez in 2017. Symptoms resolved after the procedure\par She now has symptoms that are very similar to symptoms in 2017.\par \par Had a colonoscopy a while ago. Was told there was diverticulosis, but patient does not think there was any evidence of inflammation/IBD. Does think there may have been polyps. \par \par She thinks she also may have had an endoscopy.\par Is due for one March 2022.  \par \par Has nausea after eating. Sometimes will have nausea even if she has not eaten. \par She is also having SOB which was one of the main symptoms previously \par She feels like food stops in epigastric area when she eats\par She has pain in her stomach. It is mostly crampy sensation. She gets shooting pains at times. \par Has not noticed it is with certain foods. \par Symptoms do wake her at night. \par Sometimes happens even if not eaten. \par Has not lost weight. \par Nausea is the most bothersome symptom. \par Food gets to throat and she gags, that seem to come out of nowhere. \par \par No changes in bowel habits. Sometimes when has a BM nausea improves. \par No jaundice, scleral icterus, pruritis. \par \par Used to have acid reflux, not having those symptoms now as much. \par \par PRIOR PROCEDURES:\par Sept 25 2017: ERCP with stent removal, balloon stone extraction: Distal CBD stricture-improved, choledocholithiasis\par \par June 2017: ERCP with biopsy, sphinterotomy, papillotomy, stent placement\par \par May 2017 colonoscopoy: Dr. Mast\par small internal hemorrhoids, mild sigmoid diverticulosis, otherwise normal colonoscopy\par f/u in 5 yrs \par \par 7/15/2011 EGD\par gastritis without bleeding\par \par 7/15/2011: Colonoscopy: \par 3mm transverse polyp remvoed by cold biopsy, 4mm polyp in the transverse colon removed by cold biopsy, small internal hemorrhoids\par \par PMH: \par HTN\par DM \par \par PSH: \par ERCP with Dr. Gutierrez\par lipoma in LUQ that was removed\par \par FH: \par mother had gallbladder issue\par heart disease\par No GI malignancies that she is aware of\par \par SH: \par in the past smoked cigarettes, has not smoked for years\par occasional EtOH \par no drug use\par \par MEDS: \par amlodipine\par vitamin D \par potassium\par magnesium\par \par ALL: \par NKDA \par

## 2022-01-13 NOTE — ASSESSMENT
[FreeTextEntry1] : 62 yo F PMH HTN, DM, history of an ERCP with CBD stenting for benign CBD stricture with Dr. Gutierrez in 2017 who presents for evaluation of nausea, abdominal cramping and symptoms similar to when she required an ERCP in 2017. \par \par Nausea, abdominal cramping, history of CBD stricture\par - bloodwork including LFTs \par - check AMA and ANGELA \par - MRCP for evaluation particularly given history of benign CBD stricture\par - referral to Dr. Doyle for possible ERCP\par - plan for EGD to evaluate nausea and abdominal pain to r/o PUD, esophagitis, gastritis, GOO (has a history of gastritis)\par - reviewed chart and prior colonoscopy records (had diverticulosis and hemorrhoids, no polyps) \par - currently seeing a PMD at [facility] \par - spent time reviewing records that patient brings with her to clinic\par \par Colonoscopy for personal history of polyps, due for a colonoscopy per prior colonoscopy report that was reviewed\par - will schedule for a colonoscopy\par - reviewed r/b/a/i of the procedures including but not limited to bleeding, missing an abnormal finding, perforation\par - reviewed bowel preparation instructions in detail\par - needs adult escort the day of the procedure\par - may need COVID-19 testing within 3 days of procedure\par - preprocedure labs today\par \par Follow up post-procedure

## 2022-01-17 LAB — MENADIONE SERPL-MCNC: 0.85 NG/ML

## 2022-01-18 LAB — VIT A SERPL-MCNC: 32 UG/DL

## 2022-01-27 ENCOUNTER — APPOINTMENT (OUTPATIENT)
Dept: GASTROENTEROLOGY | Facility: CLINIC | Age: 62
End: 2022-01-27
Payer: COMMERCIAL

## 2022-01-27 ENCOUNTER — RESULT REVIEW (OUTPATIENT)
Age: 62
End: 2022-01-27

## 2022-01-27 PROCEDURE — 45380 COLONOSCOPY AND BIOPSY: CPT

## 2022-01-27 PROCEDURE — 43239 EGD BIOPSY SINGLE/MULTIPLE: CPT

## 2022-02-01 ENCOUNTER — TRANSCRIPTION ENCOUNTER (OUTPATIENT)
Age: 62
End: 2022-02-01

## 2022-10-01 NOTE — PHYSICAL EXAM
-- DO NOT REPLY / DO NOT REPLY ALL --  -- Message is from [facility]agement Center [employer] ([facility]) --    ONLY TO BE USED WITHIN A REFILL MEDICATION ENCOUNTER    Med Refill  Is the patient currently having Emergent symptoms?: No    Name of medication requested: ibuprofen (MOTRIN) 600 MG tablet    Has patient contacted the pharmacy? No, direct patient to contact pharmacy first as they will be able to process the refill request directly    Is this the first request for the medication in the last 48 hours?: Yes    Patient is requesting a medication refill - medication is on active medication list    Patient is currently OUT of the requested medication - sent as HIGH priority      Full name of the provider who ordered the medication[facility] site name / Account # for provider: [place]    Preferred Pharmacy: Pharmacy  [id number] [place] [address] At [address] [place] [address]    Patient confirmed the above pharmacy as correct?  Yes      Caller Information       Type Contact Phone/Fax    10/01/2022 11:44 AM CDT Phone (Incoming) Ciara Goddard (Self) [phone] (M)          Alternative phone number: [phone]    Can a detailed message be left?: Yes    Patient is completely out of medication: verify if patient is currently experiencing symptoms. If patient is symptomatic, proceed with front end triage instead of medication refill. If patient is not symptomatic but is completely out of medication, rhianna as High priority when routing. Inform patient: “Please call back with any questions or concerns and if your condition becomes life threatening, you should seek immediate medical assistance by calling 911 or going to the Emergency Department for evaluation.”    Inform all patients: \"Please be aware the return phone call may come from an unidentified or out of state phone number and your refill request will be addressed as soon as the clinical team reviews your message.\"   [General Appearance - Alert] : alert [General Appearance - In No Acute Distress] : in no acute distress [Sclera] : the sclera and conjunctiva were normal [PERRL With Normal Accommodation] : pupils were equal in size, round, and reactive to light [Extraocular Movements] : extraocular movements were intact [Outer Ear] : the ears and nose were normal in appearance [Oropharynx] : the oropharynx was normal [Neck Appearance] : the appearance of the neck was normal [Neck Cervical Mass (___cm)] : no neck mass was observed [Jugular Venous Distention Increased] : there was no jugular-venous distention [Thyroid Diffuse Enlargement] : the thyroid was not enlarged [Thyroid Nodule] : there were no palpable thyroid nodules [Auscultation Breath Sounds / Voice Sounds] : lungs were clear to auscultation bilaterally [Heart Rate And Rhythm] : heart rate was normal and rhythm regular [Heart Sounds] : normal S1 and S2 [Heart Sounds Gallop] : no gallops [Murmurs] : no murmurs [Heart Sounds Pericardial Friction Rub] : no pericardial rub [Bowel Sounds] : normal bowel sounds [Abdomen Soft] : soft [Abdomen Tenderness] : non-tender [Abdomen Mass (___ Cm)] : no abdominal mass palpated [No CVA Tenderness] : no ~M costovertebral angle tenderness [No Spinal Tenderness] : no spinal tenderness [Abnormal Walk] : normal gait [Nail Clubbing] : no clubbing  or cyanosis of the fingernails [Musculoskeletal - Swelling] : no joint swelling seen [Motor Tone] : muscle strength and tone were normal [Skin Color & Pigmentation] : normal skin color and pigmentation [Skin Turgor] : normal skin turgor [] : no rash [Oriented To Time, Place, And Person] : oriented to person, place, and time [Impaired Insight] : insight and judgment were intact [Affect] : the affect was normal

## 2022-11-30 ENCOUNTER — INPATIENT (INPATIENT)
Facility: HOSPITAL | Age: 62
LOS: 4 days | Discharge: HOME | DRG: 417 | End: 2022-12-05
Attending: SURGERY | Admitting: SURGERY
Payer: COMMERCIAL

## 2022-11-30 VITALS
HEART RATE: 64 BPM | TEMPERATURE: 98 F | OXYGEN SATURATION: 97 % | HEIGHT: 65 IN | DIASTOLIC BLOOD PRESSURE: 94 MMHG | WEIGHT: 225.09 LBS | RESPIRATION RATE: 18 BRPM | SYSTOLIC BLOOD PRESSURE: 192 MMHG

## 2022-11-30 DIAGNOSIS — E78.5 HYPERLIPIDEMIA, UNSPECIFIED: ICD-10-CM

## 2022-11-30 DIAGNOSIS — R63.8 OTHER SYMPTOMS AND SIGNS CONCERNING FOOD AND FLUID INTAKE: ICD-10-CM

## 2022-11-30 DIAGNOSIS — R10.9 UNSPECIFIED ABDOMINAL PAIN: ICD-10-CM

## 2022-11-30 DIAGNOSIS — I10 ESSENTIAL (PRIMARY) HYPERTENSION: ICD-10-CM

## 2022-11-30 LAB
ALBUMIN SERPL ELPH-MCNC: 4.4 G/DL — SIGNIFICANT CHANGE UP (ref 3.3–5)
ALP SERPL-CCNC: 76 U/L — SIGNIFICANT CHANGE UP (ref 40–120)
ALT FLD-CCNC: 29 U/L — SIGNIFICANT CHANGE UP (ref 10–45)
ANION GAP SERPL CALC-SCNC: 11 MMOL/L — SIGNIFICANT CHANGE UP (ref 5–17)
APPEARANCE UR: ABNORMAL
APTT BLD: 29.1 SEC — SIGNIFICANT CHANGE UP (ref 27.5–35.5)
AST SERPL-CCNC: 22 U/L — SIGNIFICANT CHANGE UP (ref 10–40)
BASOPHILS # BLD AUTO: 0.01 K/UL — SIGNIFICANT CHANGE UP (ref 0–0.2)
BASOPHILS NFR BLD AUTO: 0.1 % — SIGNIFICANT CHANGE UP (ref 0–2)
BILIRUB SERPL-MCNC: 0.3 MG/DL — SIGNIFICANT CHANGE UP (ref 0.2–1.2)
BILIRUB UR-MCNC: NEGATIVE — SIGNIFICANT CHANGE UP
BUN SERPL-MCNC: 13 MG/DL — SIGNIFICANT CHANGE UP (ref 7–23)
CALCIUM SERPL-MCNC: 9.3 MG/DL — SIGNIFICANT CHANGE UP (ref 8.4–10.5)
CHLORIDE SERPL-SCNC: 102 MMOL/L — SIGNIFICANT CHANGE UP (ref 96–108)
CO2 SERPL-SCNC: 27 MMOL/L — SIGNIFICANT CHANGE UP (ref 22–31)
COLOR SPEC: YELLOW — SIGNIFICANT CHANGE UP
CREAT SERPL-MCNC: 0.84 MG/DL — SIGNIFICANT CHANGE UP (ref 0.5–1.3)
DIFF PNL FLD: NEGATIVE — SIGNIFICANT CHANGE UP
EGFR: 79 ML/MIN/1.73M2 — SIGNIFICANT CHANGE UP
EOSINOPHIL # BLD AUTO: 0.03 K/UL — SIGNIFICANT CHANGE UP (ref 0–0.5)
EOSINOPHIL NFR BLD AUTO: 0.4 % — SIGNIFICANT CHANGE UP (ref 0–6)
GLUCOSE SERPL-MCNC: 173 MG/DL — HIGH (ref 70–99)
GLUCOSE UR QL: NEGATIVE — SIGNIFICANT CHANGE UP
HCT VFR BLD CALC: 37.1 % — SIGNIFICANT CHANGE UP (ref 34.5–45)
HGB BLD-MCNC: 11.5 G/DL — SIGNIFICANT CHANGE UP (ref 11.5–15.5)
IMM GRANULOCYTES NFR BLD AUTO: 0.7 % — SIGNIFICANT CHANGE UP (ref 0–0.9)
INR BLD: 1.04 — SIGNIFICANT CHANGE UP (ref 0.88–1.16)
KETONES UR-MCNC: NEGATIVE — SIGNIFICANT CHANGE UP
LACTATE SERPL-SCNC: 1.5 MMOL/L — SIGNIFICANT CHANGE UP (ref 0.5–2)
LEUKOCYTE ESTERASE UR-ACNC: NEGATIVE — SIGNIFICANT CHANGE UP
LIDOCAIN IGE QN: 33 U/L — SIGNIFICANT CHANGE UP (ref 7–60)
LYMPHOCYTES # BLD AUTO: 1.18 K/UL — SIGNIFICANT CHANGE UP (ref 1–3.3)
LYMPHOCYTES # BLD AUTO: 17.2 % — SIGNIFICANT CHANGE UP (ref 13–44)
MCHC RBC-ENTMCNC: 27.6 PG — SIGNIFICANT CHANGE UP (ref 27–34)
MCHC RBC-ENTMCNC: 31 GM/DL — LOW (ref 32–36)
MCV RBC AUTO: 89 FL — SIGNIFICANT CHANGE UP (ref 80–100)
MONOCYTES # BLD AUTO: 0.41 K/UL — SIGNIFICANT CHANGE UP (ref 0–0.9)
MONOCYTES NFR BLD AUTO: 6 % — SIGNIFICANT CHANGE UP (ref 2–14)
NEUTROPHILS # BLD AUTO: 5.17 K/UL — SIGNIFICANT CHANGE UP (ref 1.8–7.4)
NEUTROPHILS NFR BLD AUTO: 75.6 % — SIGNIFICANT CHANGE UP (ref 43–77)
NITRITE UR-MCNC: NEGATIVE — SIGNIFICANT CHANGE UP
NRBC # BLD: 0 /100 WBCS — SIGNIFICANT CHANGE UP (ref 0–0)
PH UR: 8 — SIGNIFICANT CHANGE UP (ref 5–8)
PLATELET # BLD AUTO: 226 K/UL — SIGNIFICANT CHANGE UP (ref 150–400)
POTASSIUM SERPL-MCNC: 3.7 MMOL/L — SIGNIFICANT CHANGE UP (ref 3.5–5.3)
POTASSIUM SERPL-SCNC: 3.7 MMOL/L — SIGNIFICANT CHANGE UP (ref 3.5–5.3)
PROT SERPL-MCNC: 7.4 G/DL — SIGNIFICANT CHANGE UP (ref 6–8.3)
PROT UR-MCNC: NEGATIVE MG/DL — SIGNIFICANT CHANGE UP
PROTHROM AB SERPL-ACNC: 12.4 SEC — SIGNIFICANT CHANGE UP (ref 10.5–13.4)
RBC # BLD: 4.17 M/UL — SIGNIFICANT CHANGE UP (ref 3.8–5.2)
RBC # FLD: 15.1 % — HIGH (ref 10.3–14.5)
SARS-COV-2 RNA SPEC QL NAA+PROBE: NEGATIVE — SIGNIFICANT CHANGE UP
SODIUM SERPL-SCNC: 140 MMOL/L — SIGNIFICANT CHANGE UP (ref 135–145)
SP GR SPEC: 1.01 — SIGNIFICANT CHANGE UP (ref 1–1.03)
UROBILINOGEN FLD QL: 0.2 E.U./DL — SIGNIFICANT CHANGE UP
WBC # BLD: 6.85 K/UL — SIGNIFICANT CHANGE UP (ref 3.8–10.5)
WBC # FLD AUTO: 6.85 K/UL — SIGNIFICANT CHANGE UP (ref 3.8–10.5)

## 2022-11-30 PROCEDURE — 99222 1ST HOSP IP/OBS MODERATE 55: CPT

## 2022-11-30 PROCEDURE — 99285 EMERGENCY DEPT VISIT HI MDM: CPT

## 2022-11-30 PROCEDURE — 71045 X-RAY EXAM CHEST 1 VIEW: CPT | Mod: 26

## 2022-11-30 PROCEDURE — 74177 CT ABD & PELVIS W/CONTRAST: CPT | Mod: 26,MA

## 2022-11-30 RX ORDER — MORPHINE SULFATE 50 MG/1
8 CAPSULE, EXTENDED RELEASE ORAL ONCE
Refills: 0 | Status: DISCONTINUED | OUTPATIENT
Start: 2022-11-30 | End: 2022-11-30

## 2022-11-30 RX ORDER — AMLODIPINE BESYLATE 2.5 MG/1
5 TABLET ORAL AT BEDTIME
Refills: 0 | Status: DISCONTINUED | OUTPATIENT
Start: 2022-11-30 | End: 2022-12-05

## 2022-11-30 RX ORDER — MORPHINE SULFATE 50 MG/1
4 CAPSULE, EXTENDED RELEASE ORAL EVERY 4 HOURS
Refills: 0 | Status: DISCONTINUED | OUTPATIENT
Start: 2022-12-01 | End: 2022-12-03

## 2022-11-30 RX ORDER — ENOXAPARIN SODIUM 100 MG/ML
40 INJECTION SUBCUTANEOUS EVERY 24 HOURS
Refills: 0 | Status: DISCONTINUED | OUTPATIENT
Start: 2022-11-30 | End: 2022-12-03

## 2022-11-30 RX ORDER — ONDANSETRON 8 MG/1
4 TABLET, FILM COATED ORAL ONCE
Refills: 0 | Status: COMPLETED | OUTPATIENT
Start: 2022-11-30 | End: 2022-11-30

## 2022-11-30 RX ORDER — ROSUVASTATIN CALCIUM 5 MG/1
0 TABLET ORAL
Qty: 0 | Refills: 5 | DISCHARGE

## 2022-11-30 RX ORDER — SODIUM CHLORIDE 9 MG/ML
1000 INJECTION INTRAMUSCULAR; INTRAVENOUS; SUBCUTANEOUS ONCE
Refills: 0 | Status: COMPLETED | OUTPATIENT
Start: 2022-11-30 | End: 2022-11-30

## 2022-11-30 RX ORDER — KETOROLAC TROMETHAMINE 30 MG/ML
30 SYRINGE (ML) INJECTION EVERY 8 HOURS
Refills: 0 | Status: DISCONTINUED | OUTPATIENT
Start: 2022-11-30 | End: 2022-12-01

## 2022-11-30 RX ORDER — AMLODIPINE BESYLATE AND BENAZEPRIL HYDROCHLORIDE 10; 20 MG/1; MG/1
0 CAPSULE ORAL
Qty: 0 | Refills: 1 | DISCHARGE

## 2022-11-30 RX ORDER — MORPHINE SULFATE 50 MG/1
4 CAPSULE, EXTENDED RELEASE ORAL ONCE
Refills: 0 | Status: DISCONTINUED | OUTPATIENT
Start: 2022-11-30 | End: 2022-11-30

## 2022-11-30 RX ORDER — ATORVASTATIN CALCIUM 80 MG/1
40 TABLET, FILM COATED ORAL AT BEDTIME
Refills: 0 | Status: DISCONTINUED | OUTPATIENT
Start: 2022-11-30 | End: 2022-12-05

## 2022-11-30 RX ORDER — MORPHINE SULFATE 50 MG/1
4 CAPSULE, EXTENDED RELEASE ORAL EVERY 4 HOURS
Refills: 0 | Status: DISCONTINUED | OUTPATIENT
Start: 2022-11-30 | End: 2022-11-30

## 2022-11-30 RX ORDER — LISINOPRIL 2.5 MG/1
10 TABLET ORAL DAILY
Refills: 0 | Status: DISCONTINUED | OUTPATIENT
Start: 2022-11-30 | End: 2022-12-05

## 2022-11-30 RX ADMIN — MORPHINE SULFATE 8 MILLIGRAM(S): 50 CAPSULE, EXTENDED RELEASE ORAL at 21:29

## 2022-11-30 RX ADMIN — AMLODIPINE BESYLATE 5 MILLIGRAM(S): 2.5 TABLET ORAL at 22:36

## 2022-11-30 RX ADMIN — MORPHINE SULFATE 4 MILLIGRAM(S): 50 CAPSULE, EXTENDED RELEASE ORAL at 17:26

## 2022-11-30 RX ADMIN — SODIUM CHLORIDE 1000 MILLILITER(S): 9 INJECTION INTRAMUSCULAR; INTRAVENOUS; SUBCUTANEOUS at 17:26

## 2022-11-30 RX ADMIN — MORPHINE SULFATE 4 MILLIGRAM(S): 50 CAPSULE, EXTENDED RELEASE ORAL at 21:54

## 2022-11-30 RX ADMIN — MORPHINE SULFATE 8 MILLIGRAM(S): 50 CAPSULE, EXTENDED RELEASE ORAL at 18:57

## 2022-11-30 RX ADMIN — ATORVASTATIN CALCIUM 40 MILLIGRAM(S): 80 TABLET, FILM COATED ORAL at 22:36

## 2022-11-30 RX ADMIN — ONDANSETRON 4 MILLIGRAM(S): 8 TABLET, FILM COATED ORAL at 17:26

## 2022-11-30 NOTE — H&P ADULT - HISTORY OF PRESENT ILLNESS
HPI  61 y/o F with PMHx distal CBD stricture s/p stent placement on 6/8/2017 (on amlodipine) and HLD (on rosuvastatin) presents to ED c/o epigastric and right sided abdominal pain that began suddenly at 2:30pm today while pt was at work. Pain radiates to lower abdomen and to her back. Also with associated mild SOB, nausea and vomiting. Pt currently c/o feeling feverish with chills. Denies cough, congestion, diarrhea, hematuria, or rash. Last BM today.    In ED patient received 4mg x2 morphine and 8mg x 1 morphine. GI consulted who rec MRCP new multiple nondependent densities in   gallbladder, polyps affective adherent sludge. Worsening CBD dilation since prior exam.  HPI  61 y/o F with PMHx distal CBD stricture s/p stent placement on 6/8/2017, HTN, and HLD (on rosuvastatin) presents to ED c/o epigastric and right sided abdominal pain that began suddenly at 2:30pm today while pt was at work. Pain radiates to lower abdomen and to her back. Also with associated mild SOB, nausea and vomiting. Pt currently c/o feeling feverish with chills. Denies cough, congestion, diarrhea, hematuria, or rash. Last BM today.    In ED patient received 4mg x2 morphine and 8mg x 1 morphine. GI consulted who rec MRCP new multiple nondependent densities in   gallbladder, polyps affective adherent sludge. Worsening CBD dilation since prior exam.

## 2022-11-30 NOTE — ED PROVIDER NOTE - PROGRESS NOTE DETAILS
1. Since June 12, 2017, new multiple nondependent densities in   gallbladder, polyps or [place] affective adherent sludge. New prominent   gastrohepatic ligament lymph node. Recommend MRI without and with venous   contrast for further evaluation.  No acute cholecystitis.  2. No intrahepatic biliary dilatation. Increased common bile duct   dilatation, post stent removal.  3. No suspicious liver lesion. Severe hepatic steatosis.    pt states she is still in pain will order more morphine. d/w general surgery who recommends medicine admission as there are no gallstones. GI recommends mrcp and mr liver with and without contrast and they will see pt in ED d/w ilana who agrees with plan

## 2022-11-30 NOTE — H&P ADULT - PROBLEM/PLAN-4
DISPLAY PLAN FREE TEXT LABS:                        8.8<L>  8.10  )-----------( 395      ( 02 Nov 2018 14:01 )             28.9<L>    140    |  102    |  11     ----------------------------<  101<H>    02 Nov 2018 14:01  4.4     |  33<H>  |  0.60     Ca 8.7           02 Nov 2018 14:01    TPro  5.5<L>  /  Alb  1.9<L>  /  TBili  0.4    /  DBili  x      /  AST  22     /  ALT  14     /  AlkPhos  97     02 Nov 2018 14:01    PT/INR - ( 02 Nov 2018 14:01 )   PT: 12.3 ;   INR: 1.12         PTT - ( 02 Nov 2018 14:01 )  PTT:27.5     EKG:  sinus tachycardia with flattened TW in III, aVL, aVF  Radiology:  < from: CT Abdomen and Pelvis w/ Oral Cont (11.02.18 @ 16:38) >      Minimal effusion right lung base. Coronary artery calcifications present.    Massive intra-abdominal and intrapelvic ascites present. Hypodensity in   the right hepatic lobe unchanged prior exam have the appearance of a   lobulated cyst. The spleen is not enlarged. Splenic calcified granulomas   present. Atrophic pancreas. Poor definition of kidneys and adrenal glands   due to the massive ascites. No aortic aneurysm. Soft tissue densities in   the retroperitoneal region poorly defined due to lack of IV contrast and    the massive ascites, representing adenopathy. No biliary ductal   dilatation.    No bowel obstruction. Orally ingested contrast filled small bowel but has   not yet reached the large bowel. Pelvic sidewall adenopathy, presacral   adenopathy, these findings are   poorly defined due to lack of IV   contrast and the massive ascites.    Urinary bladder is poorly defined. Inguinal regions intact. No acute   appearing osseous abnormalities.    IMPRESSION:    Massive intra-abdominal and intrapelvic ascites. See above report.    < end of copied text >

## 2022-11-30 NOTE — ED PROVIDER NOTE - OBJECTIVE STATEMENT
63 y/o F with PMHx distal CBD stricture s/p stent placement on 6/8/2017 (on amlodipine) and HLD (on rosuvastatin) presents to ED c/o epigastric and right sided abdominal pain that began suddenly at 2:30pm today while pt was at work. Pain radiates to lower abdomen and to her back. Also with associated mild SOB, nausea and vomiting. Pt currently c/o feeling feverish with chills. Denies cough, congestion, diarrhea, hematuria, or rash. Last BM today.

## 2022-11-30 NOTE — ED ADULT TRIAGE NOTE - CHIEF COMPLAINT QUOTE
Pt c/o midsternal chest pain and tightness that started at 3pm today. +SOB. Reports 1 episode of vomiting.

## 2022-11-30 NOTE — H&P ADULT - PROBLEM SELECTOR PLAN 1
Patient with RUQ, epigastric, periumbillical pain and CT findings with gallbladder slude and worsening CBD dilation. No hx of fatty meal today and patient ate a salad before the pain began. Has received 12 IV morphine in ED with mild relief. GI consulted and rec further imaging and will see patient   -f/u MRCP   -pain control Patient with RUQ, epigastric, periumbillical pain and CT findings with gallbladder slude and worsening CBD dilation. No hx of fatty meal today and patient ate a salad before the pain began. Has received 12 IV morphine in ED with mild relief. GI consulted and rec further imaging and will see patient   -f/u MRCP   -pain control with standing toradol and PRN morphine for breakthrough

## 2022-11-30 NOTE — H&P ADULT - ASSESSMENT
63 y/o F with PMHx distal CBD stricture s/p stent placement on 6/8/2017 (on amlodipine) and HLD (on rosuvastatin) presents to ED c/o epigastric and right sided abdominal pain that began suddenly at 2:30pm today while pt was at work found to have gallbladder sludge and worsening CBD dilation

## 2022-11-30 NOTE — H&P ADULT - NSHPPHYSICALEXAM_GEN_ALL_CORE
Vital Signs Last 12 Hrs  T(F): 99.1 (11-30-22 @ 19:38), Max: 99.1 (11-30-22 @ 19:38)  HR: 77 (11-30-22 @ 19:38) (64 - 77)  BP: 180/96 (11-30-22 @ 19:38) (180/96 - 192/94)  BP(mean): --  RR: 18 (11-30-22 @ 19:38) (18 - 18)  SpO2: 95% (11-30-22 @ 19:38) (95% - 97%)    PHYSICAL EXAM:  Constitutional: NAD, comfortable in bed.  HEENT: NC/AT, PERRLA, EOMI, no conjunctival pallor or scleral icterus, MMM  Neck: Supple, no JVD  Respiratory: CTA B/L. No w/r/r.   Cardiovascular: RRR, normal S1 and S2, no m/r/g.   Gastrointestinal: +BS, TTP over epigastric area, RUQ, periumbillical region. no rebound, guarding  Extremities: wwp; no cyanosis, clubbing or edema.   Vascular: Pulses equal and strong throughout.   Neurological: AAOx3, no focal deficits  Skin: No gross skin abnormalities or rashes

## 2022-11-30 NOTE — H&P ADULT - NSHPLABSRESULTS_GEN_ALL_CORE
LABS:                         11.5   6.85  )-----------( 226      ( 2022 17:25 )             37.1         140  |  102  |  13  ----------------------------<  173<H>  3.7   |  27  |  0.84    Ca    9.3      2022 17:25    TPro  7.4  /  Alb  4.4  /  TBili  0.3  /  DBili  x   /  AST  22  /  ALT  29  /  AlkPhos  76      PT/INR - ( 2022 17:25 )   PT: 12.4 sec;   INR: 1.04          PTT - ( 2022 17:25 )  PTT:29.1 sec  Urinalysis Basic - ( 2022 17:00 )    Color: Yellow / Appearance: Cloudy / S.015 / pH: x  Gluc: x / Ketone: NEGATIVE  / Bili: Negative / Urobili: 0.2 E.U./dL   Blood: x / Protein: NEGATIVE mg/dL / Nitrite: NEGATIVE   Leuk Esterase: NEGATIVE / RBC: x / WBC x   Sq Epi: x / Non Sq Epi: x / Bacteria: x            Lactate, Blood: 1.5 mmol/L ( @ 17:25)      RADIOLOGY, EKG & ADDITIONAL TESTS:

## 2022-11-30 NOTE — ED PROVIDER NOTE - PHYSICAL EXAMINATION
GENERAL: Uncomforable appearing, well nourished, awake, alert and in moderate distress. Unable to sit still in stretcher.   ENMT: Airway patent,   EYES: conjunctiva clear  CARDIAC: Regular rate, regular rhythm.  Heart sounds S1, S2, no S3, S4. No murmurs, rubs or gallops.  RESPIRATORY: Breath sounds clear and equal in bilateral anterior lung fields, no wheezes/ronchi/stridor; pt breathing and speaking comfortably with no increased WOB, no accessory mm. use, no intercostal retractions, no nasal flaring  GI: abdomen soft, non-distended, +tenderness to palpation in epigastrium and RUQ with guarding, no tenderness in LUQ, RLQ, or LLQ. No rebound.   MSK: No LE edema.   NEURO: Alert and oriented, no focal deficits.

## 2022-11-30 NOTE — H&P ADULT - PROBLEM SELECTOR PLAN 2
Patient with elevated BP likely i/s/o pain as reports compliance with home meds   -c/w home meds  -pain control

## 2022-11-30 NOTE — ED PROVIDER NOTE - CLINICAL SUMMARY MEDICAL DECISION MAKING FREE TEXT BOX
61 y/o F presents to ED c/o sudden onset epigastric and RUQ abdominal pain radiating to back and lower abdomen with associated SOB, nausea, and vomiting since 2:30pm.     On exam BP is 192/94. VS otherwise normal. +epigastric and RUQ tenderness. Pt is uncomfortable appearing.     DDx: cholecystitis vs choledocholithiasis vs pancreatitis vs ACS    Plan:  - EKG  - CXR  - labs  - CT a/p   - give morphine, zofran, and IV fluids   - reassess

## 2022-11-30 NOTE — H&P ADULT - ATTENDING COMMENTS
serial abdominal exam  GI consult appreciated  Pain management   Plan for MRCP/ERCP  Discharge planning

## 2022-11-30 NOTE — ED ADULT NURSE NOTE - OBJECTIVE STATEMENT
Pt received aaox3 c/p right upper quadrant abd pain since 1430 today. Pt states the pain is radiating down to the lower abd and wraps around her shoulder and back. Pt c/o nausea with 1x episode of vomiting, SOB, and chills. Pt denies any cough, diarrhea, constipation, urinary symptoms, CP, dizziness.

## 2022-12-01 LAB
ALBUMIN SERPL ELPH-MCNC: 4.6 G/DL — SIGNIFICANT CHANGE UP (ref 3.3–5)
ALP SERPL-CCNC: 78 U/L — SIGNIFICANT CHANGE UP (ref 40–120)
ALT FLD-CCNC: 37 U/L — SIGNIFICANT CHANGE UP (ref 10–45)
ANION GAP SERPL CALC-SCNC: 12 MMOL/L — SIGNIFICANT CHANGE UP (ref 5–17)
AST SERPL-CCNC: 26 U/L — SIGNIFICANT CHANGE UP (ref 10–40)
BASOPHILS # BLD AUTO: 0.01 K/UL — SIGNIFICANT CHANGE UP (ref 0–0.2)
BASOPHILS NFR BLD AUTO: 0.1 % — SIGNIFICANT CHANGE UP (ref 0–2)
BILIRUB SERPL-MCNC: 0.6 MG/DL — SIGNIFICANT CHANGE UP (ref 0.2–1.2)
BUN SERPL-MCNC: 11 MG/DL — SIGNIFICANT CHANGE UP (ref 7–23)
CALCIUM SERPL-MCNC: 9.8 MG/DL — SIGNIFICANT CHANGE UP (ref 8.4–10.5)
CHLORIDE SERPL-SCNC: 99 MMOL/L — SIGNIFICANT CHANGE UP (ref 96–108)
CO2 SERPL-SCNC: 27 MMOL/L — SIGNIFICANT CHANGE UP (ref 22–31)
CREAT SERPL-MCNC: 0.76 MG/DL — SIGNIFICANT CHANGE UP (ref 0.5–1.3)
EGFR: 89 ML/MIN/1.73M2 — SIGNIFICANT CHANGE UP
EOSINOPHIL # BLD AUTO: 0 K/UL — SIGNIFICANT CHANGE UP (ref 0–0.5)
EOSINOPHIL NFR BLD AUTO: 0 % — SIGNIFICANT CHANGE UP (ref 0–6)
GLUCOSE SERPL-MCNC: 142 MG/DL — HIGH (ref 70–99)
HCT VFR BLD CALC: 37.9 % — SIGNIFICANT CHANGE UP (ref 34.5–45)
HCV AB S/CO SERPL IA: 0.04 S/CO — SIGNIFICANT CHANGE UP
HCV AB SERPL-IMP: SIGNIFICANT CHANGE UP
HGB BLD-MCNC: 11.8 G/DL — SIGNIFICANT CHANGE UP (ref 11.5–15.5)
IMM GRANULOCYTES NFR BLD AUTO: 0.6 % — SIGNIFICANT CHANGE UP (ref 0–0.9)
LYMPHOCYTES # BLD AUTO: 1.22 K/UL — SIGNIFICANT CHANGE UP (ref 1–3.3)
LYMPHOCYTES # BLD AUTO: 11.9 % — LOW (ref 13–44)
MAGNESIUM SERPL-MCNC: 2.2 MG/DL — SIGNIFICANT CHANGE UP (ref 1.6–2.6)
MCHC RBC-ENTMCNC: 27.6 PG — SIGNIFICANT CHANGE UP (ref 27–34)
MCHC RBC-ENTMCNC: 31.1 GM/DL — LOW (ref 32–36)
MCV RBC AUTO: 88.8 FL — SIGNIFICANT CHANGE UP (ref 80–100)
MONOCYTES # BLD AUTO: 0.52 K/UL — SIGNIFICANT CHANGE UP (ref 0–0.9)
MONOCYTES NFR BLD AUTO: 5.1 % — SIGNIFICANT CHANGE UP (ref 2–14)
NEUTROPHILS # BLD AUTO: 8.46 K/UL — HIGH (ref 1.8–7.4)
NEUTROPHILS NFR BLD AUTO: 82.3 % — HIGH (ref 43–77)
NRBC # BLD: 0 /100 WBCS — SIGNIFICANT CHANGE UP (ref 0–0)
PHOSPHATE SERPL-MCNC: 3.6 MG/DL — SIGNIFICANT CHANGE UP (ref 2.5–4.5)
PLATELET # BLD AUTO: 236 K/UL — SIGNIFICANT CHANGE UP (ref 150–400)
POTASSIUM SERPL-MCNC: 4.1 MMOL/L — SIGNIFICANT CHANGE UP (ref 3.5–5.3)
POTASSIUM SERPL-SCNC: 4.1 MMOL/L — SIGNIFICANT CHANGE UP (ref 3.5–5.3)
PROT SERPL-MCNC: 7.9 G/DL — SIGNIFICANT CHANGE UP (ref 6–8.3)
RBC # BLD: 4.27 M/UL — SIGNIFICANT CHANGE UP (ref 3.8–5.2)
RBC # FLD: 15.3 % — HIGH (ref 10.3–14.5)
SODIUM SERPL-SCNC: 138 MMOL/L — SIGNIFICANT CHANGE UP (ref 135–145)
WBC # BLD: 10.27 K/UL — SIGNIFICANT CHANGE UP (ref 3.8–10.5)
WBC # FLD AUTO: 10.27 K/UL — SIGNIFICANT CHANGE UP (ref 3.8–10.5)

## 2022-12-01 PROCEDURE — 99222 1ST HOSP IP/OBS MODERATE 55: CPT

## 2022-12-01 RX ORDER — ACETAMINOPHEN 500 MG
650 TABLET ORAL EVERY 6 HOURS
Refills: 0 | Status: DISCONTINUED | OUTPATIENT
Start: 2022-12-01 | End: 2022-12-02

## 2022-12-01 RX ADMIN — MORPHINE SULFATE 4 MILLIGRAM(S): 50 CAPSULE, EXTENDED RELEASE ORAL at 02:11

## 2022-12-01 RX ADMIN — LISINOPRIL 10 MILLIGRAM(S): 2.5 TABLET ORAL at 05:39

## 2022-12-01 RX ADMIN — ATORVASTATIN CALCIUM 40 MILLIGRAM(S): 80 TABLET, FILM COATED ORAL at 22:51

## 2022-12-01 RX ADMIN — ENOXAPARIN SODIUM 40 MILLIGRAM(S): 100 INJECTION SUBCUTANEOUS at 22:50

## 2022-12-01 RX ADMIN — MORPHINE SULFATE 4 MILLIGRAM(S): 50 CAPSULE, EXTENDED RELEASE ORAL at 07:18

## 2022-12-01 RX ADMIN — Medication 30 MILLIGRAM(S): at 01:31

## 2022-12-01 RX ADMIN — MORPHINE SULFATE 4 MILLIGRAM(S): 50 CAPSULE, EXTENDED RELEASE ORAL at 04:45

## 2022-12-01 RX ADMIN — Medication 650 MILLIGRAM(S): at 21:58

## 2022-12-01 RX ADMIN — Medication 650 MILLIGRAM(S): at 21:28

## 2022-12-01 RX ADMIN — Medication 30 MILLIGRAM(S): at 08:54

## 2022-12-01 RX ADMIN — Medication 30 MILLIGRAM(S): at 09:30

## 2022-12-01 RX ADMIN — MORPHINE SULFATE 4 MILLIGRAM(S): 50 CAPSULE, EXTENDED RELEASE ORAL at 13:27

## 2022-12-01 RX ADMIN — ENOXAPARIN SODIUM 40 MILLIGRAM(S): 100 INJECTION SUBCUTANEOUS at 00:29

## 2022-12-01 RX ADMIN — AMLODIPINE BESYLATE 5 MILLIGRAM(S): 2.5 TABLET ORAL at 22:51

## 2022-12-01 RX ADMIN — Medication 30 MILLIGRAM(S): at 00:29

## 2022-12-01 NOTE — CONSULT NOTE ADULT - ASSESSMENT
63 y/o F with PMHx distal CBD stricture s/p stent placement on 6/8/2017 (on amlodipine) and HLD (on rosuvastatin) presents to ED c/o epigastric and right sided abdominal pain that began suddenly 11/30 found to have new multiple nondependent densities in gallbladder and new lymph node. GI consulted for further evaluation.    #Abdominal pain  #Hx of CBD strictures  #Abnormal gallbladder imaging  CT AP 11/30:   1. Since June 12, 2017, new multiple nondependent densities in gallbladder, polyps or [place] affective adherent sludge. New prominent gastrohepatic ligament lymph node. Recommend MRI without and with venous contrast for further evaluation.  No acute cholecystitis.  2. No intrahepatic biliary dilatation. Increased common bile duct dilatation, post stent removal.  3. No suspicious liver lesion. Severe hepatic steatosis.    LTs wnl    Recommendations:  - Obtain MRCP    Enedelia Neville MD  Gastroenterology Fellow, PGY -5   Weekday Pager [phone]

## 2022-12-01 NOTE — PROGRESS NOTE ADULT - PROBLEM SELECTOR PLAN 1
Patient with RUQ, epigastric, periumbillical pain and CT findings with gallbladder slude and worsening CBD dilation. No hx of fatty meal today and patient ate a salad before the pain began. Has received 12 IV morphine in ED with mild relief. Radiating to back and chest on initial presentation, however EKG unremarkable for cardiac ischemia   -GI consulted - appreciate recs   -f/u MRCP   -pain control with PRN tylenol and PRN morphine for breakthrough

## 2022-12-01 NOTE — PROGRESS NOTE ADULT - ASSESSMENT
63 y/o F with PMHx distal CBD stricture s/p stent placement on 6/8/2017 (on amlodipine) and HLD (on rosuvastatin) presents to ED c/o epigastric and right sided abdominal pain and admitted for abdominal pain likely 2/2 cholelithiasis vs biliary colic

## 2022-12-01 NOTE — PROGRESS NOTE ADULT - PROBLEM SELECTOR PLAN 2
Patient with elevated BP likely i/s/o pain as reports compliance with home meds   -c/w Amlodipine 5 mg qd, lisinopril 10 mg qd   -pain control

## 2022-12-01 NOTE — CONSULT NOTE ADULT - ATTENDING COMMENTS
Patient seen and examined with Dr. Neville.   Patient with persistent RUQ abd pain. Imaging notable for abnormal GB and dilated CBD; however, lab without evidence of biliary obstruction. Continue supportive management and obtain MRI/MRCP. Management of gallbladder disease per surgery team.

## 2022-12-01 NOTE — PATIENT PROFILE ADULT - FALL HARM RISK - UNIVERSAL INTERVENTIONS
Bed in lowest position, wheels locked, appropriate side rails in place/Call bell, personal items and telephone in reach/Instruct patient to call for assistance before getting out of bed or chair/Non-slip footwear when patient is out of bed/[place] to call system/Physically safe environment - no spills, clutter or unnecessary equipment/Purposeful Proactive Rounding/Room/bathroom lighting operational, light cord in reach

## 2022-12-01 NOTE — PROGRESS NOTE ADULT - SUBJECTIVE AND OBJECTIVE BOX
OVERNIGHT EVENTS: Admitted for abdominal pain     SUBJECTIVE: On exam, pt in mild distress from pain, states it is 9/10 but now duller than before s/p pain control. Pt currently states she only ate a salad prior to the episode, however consumes a lot of fatty/spicy foods. Endorses nausea, no current vomiting, no diarrhea. States pain radiates to L back     VITAL SIGNS:  Vital Signs Last 24 Hrs  T(C): 37.2 (01 Dec 2022 16:25), Max: 37.3 (30 Nov 2022 19:38)  T(F): 99 (01 Dec 2022 16:25), Max: 99.1 (30 Nov 2022 19:38)  HR: 77 (01 Dec 2022 16:25) (66 - 91)  BP: 153/83 (01 Dec 2022 16:25) (134/84 - 180/96)  BP(mean): --  RR: 18 (01 Dec 2022 16:25) (14 - 18)  SpO2: 96% (01 Dec 2022 16:25) (92% - 98%)    Parameters below as of 01 Dec 2022 16:25  Patient On (Oxygen Delivery Method): room air        PHYSICAL EXAM:  General: mild stress due to pain, obese female   HEENT: NC/AT; PERRL; EOMI; MMM  Neck: supple; no JVD  Cardiac: RRR; +S1/S2  Pulm: CTA B/L; no W/R/R  GI: soft, mid and right upper quadrant tender to light palpation, no rebound present, non distended, bowel sounds present   Extremities: WWP; no edema, clubbing or cyanosis  Vasc: 2+ radial, DP pulses B/L  Neuro: AAOx3; no focal deficits    MEDICATIONS:  MEDICATIONS  (STANDING):  amLODIPine   Tablet 5 milliGRAM(s) Oral at bedtime  atorvastatin 40 milliGRAM(s) Oral at bedtime  enoxaparin Injectable 40 milliGRAM(s) SubCutaneous every 24 hours  lisinopril 10 milliGRAM(s) Oral daily    MEDICATIONS  (PRN):  acetaminophen     Tablet .. 650 milliGRAM(s) Oral every 6 hours PRN Moderate Pain (4 - 6)  morphine  - Injectable 4 milliGRAM(s) IV Push every 4 hours PRN Severe Pain (7 - 10)      ALLERGIES:  Allergies    No Known Allergies    Intolerances        LABS:                        11.8   10.27 )-----------( 236      ( 01 Dec 2022 05:56 )             37.9     12-01    138  |  99  |  11  ----------------------------<  142<H>  4.1   |  27  |  0.76    Ca    9.8      01 Dec 2022 05:56  Phos  3.6     12-01  Mg     2.2     12-01    TPro  7.9  /  Alb  4.6  /  TBili  0.6  /  DBili  x   /  AST  26  /  ALT  37  /  AlkPhos  78  12-01    PT/INR - ( 30 Nov 2022 17:25 )   PT: 12.4 sec;   INR: 1.04          PTT - ( 30 Nov 2022 17:25 )  PTT:29.1 sec    RADIOLOGY & ADDITIONAL TESTS: Reviewed.

## 2022-12-02 ENCOUNTER — RESULT REVIEW (OUTPATIENT)
Age: 62
End: 2022-12-02

## 2022-12-02 ENCOUNTER — TRANSCRIPTION ENCOUNTER (OUTPATIENT)
Age: 62
End: 2022-12-02

## 2022-12-02 LAB
ALBUMIN SERPL ELPH-MCNC: 4.2 G/DL — SIGNIFICANT CHANGE UP (ref 3.3–5)
ALP SERPL-CCNC: 68 U/L — SIGNIFICANT CHANGE UP (ref 40–120)
ALT FLD-CCNC: 26 U/L — SIGNIFICANT CHANGE UP (ref 10–45)
ANION GAP SERPL CALC-SCNC: 11 MMOL/L — SIGNIFICANT CHANGE UP (ref 5–17)
AST SERPL-CCNC: 16 U/L — SIGNIFICANT CHANGE UP (ref 10–40)
BASOPHILS # BLD AUTO: 0.02 K/UL — SIGNIFICANT CHANGE UP (ref 0–0.2)
BASOPHILS NFR BLD AUTO: 0.1 % — SIGNIFICANT CHANGE UP (ref 0–2)
BILIRUB SERPL-MCNC: 1.3 MG/DL — HIGH (ref 0.2–1.2)
BUN SERPL-MCNC: 10 MG/DL — SIGNIFICANT CHANGE UP (ref 7–23)
CALCIUM SERPL-MCNC: 9.3 MG/DL — SIGNIFICANT CHANGE UP (ref 8.4–10.5)
CHLORIDE SERPL-SCNC: 98 MMOL/L — SIGNIFICANT CHANGE UP (ref 96–108)
CO2 SERPL-SCNC: 25 MMOL/L — SIGNIFICANT CHANGE UP (ref 22–31)
CREAT SERPL-MCNC: 0.88 MG/DL — SIGNIFICANT CHANGE UP (ref 0.5–1.3)
EGFR: 74 ML/MIN/1.73M2 — SIGNIFICANT CHANGE UP
EOSINOPHIL # BLD AUTO: 0 K/UL — SIGNIFICANT CHANGE UP (ref 0–0.5)
EOSINOPHIL NFR BLD AUTO: 0 % — SIGNIFICANT CHANGE UP (ref 0–6)
GLUCOSE SERPL-MCNC: 131 MG/DL — HIGH (ref 70–99)
HCT VFR BLD CALC: 38 % — SIGNIFICANT CHANGE UP (ref 34.5–45)
HGB BLD-MCNC: 11.8 G/DL — SIGNIFICANT CHANGE UP (ref 11.5–15.5)
IMM GRANULOCYTES NFR BLD AUTO: 0.6 % — SIGNIFICANT CHANGE UP (ref 0–0.9)
LYMPHOCYTES # BLD AUTO: 1.46 K/UL — SIGNIFICANT CHANGE UP (ref 1–3.3)
LYMPHOCYTES # BLD AUTO: 10.6 % — LOW (ref 13–44)
MAGNESIUM SERPL-MCNC: 2.1 MG/DL — SIGNIFICANT CHANGE UP (ref 1.6–2.6)
MCHC RBC-ENTMCNC: 26.9 PG — LOW (ref 27–34)
MCHC RBC-ENTMCNC: 31.1 GM/DL — LOW (ref 32–36)
MCV RBC AUTO: 86.8 FL — SIGNIFICANT CHANGE UP (ref 80–100)
MONOCYTES # BLD AUTO: 0.96 K/UL — HIGH (ref 0–0.9)
MONOCYTES NFR BLD AUTO: 7 % — SIGNIFICANT CHANGE UP (ref 2–14)
NEUTROPHILS # BLD AUTO: 11.24 K/UL — HIGH (ref 1.8–7.4)
NEUTROPHILS NFR BLD AUTO: 81.7 % — HIGH (ref 43–77)
NRBC # BLD: 0 /100 WBCS — SIGNIFICANT CHANGE UP (ref 0–0)
PHOSPHATE SERPL-MCNC: 2.1 MG/DL — LOW (ref 2.5–4.5)
PLATELET # BLD AUTO: 221 K/UL — SIGNIFICANT CHANGE UP (ref 150–400)
POTASSIUM SERPL-MCNC: 3.6 MMOL/L — SIGNIFICANT CHANGE UP (ref 3.5–5.3)
POTASSIUM SERPL-SCNC: 3.6 MMOL/L — SIGNIFICANT CHANGE UP (ref 3.5–5.3)
PROT SERPL-MCNC: 7.5 G/DL — SIGNIFICANT CHANGE UP (ref 6–8.3)
RBC # BLD: 4.38 M/UL — SIGNIFICANT CHANGE UP (ref 3.8–5.2)
RBC # FLD: 15.2 % — HIGH (ref 10.3–14.5)
SODIUM SERPL-SCNC: 134 MMOL/L — LOW (ref 135–145)
WBC # BLD: 13.76 K/UL — HIGH (ref 3.8–10.5)
WBC # FLD AUTO: 13.76 K/UL — HIGH (ref 3.8–10.5)

## 2022-12-02 PROCEDURE — 74181 MRI ABDOMEN W/O CONTRAST: CPT | Mod: 26

## 2022-12-02 PROCEDURE — 99232 SBSQ HOSP IP/OBS MODERATE 35: CPT

## 2022-12-02 PROCEDURE — 88304 TISSUE EXAM BY PATHOLOGIST: CPT | Mod: 26

## 2022-12-02 PROCEDURE — 76705 ECHO EXAM OF ABDOMEN: CPT | Mod: 26

## 2022-12-02 RX ORDER — PIPERACILLIN AND TAZOBACTAM 4; .5 G/20ML; G/20ML
3.38 INJECTION, POWDER, LYOPHILIZED, FOR SOLUTION INTRAVENOUS ONCE
Refills: 0 | Status: COMPLETED | OUTPATIENT
Start: 2022-12-02 | End: 2022-12-02

## 2022-12-02 RX ORDER — POTASSIUM PHOSPHATE, MONOBASIC POTASSIUM PHOSPHATE, DIBASIC 236; 224 MG/ML; MG/ML
30 INJECTION, SOLUTION INTRAVENOUS ONCE
Refills: 0 | Status: COMPLETED | OUTPATIENT
Start: 2022-12-02 | End: 2022-12-02

## 2022-12-02 RX ORDER — PIPERACILLIN AND TAZOBACTAM 4; .5 G/20ML; G/20ML
3.38 INJECTION, POWDER, LYOPHILIZED, FOR SOLUTION INTRAVENOUS ONCE
Refills: 0 | Status: COMPLETED | OUTPATIENT
Start: 2022-12-02 | End: 2022-12-03

## 2022-12-02 RX ORDER — SODIUM CHLORIDE 9 MG/ML
1000 INJECTION INTRAMUSCULAR; INTRAVENOUS; SUBCUTANEOUS ONCE
Refills: 0 | Status: COMPLETED | OUTPATIENT
Start: 2022-12-02 | End: 2022-12-02

## 2022-12-02 RX ORDER — ACETAMINOPHEN 500 MG
650 TABLET ORAL EVERY 6 HOURS
Refills: 0 | Status: DISCONTINUED | OUTPATIENT
Start: 2022-12-02 | End: 2022-12-03

## 2022-12-02 RX ORDER — SODIUM CHLORIDE 9 MG/ML
2000 INJECTION INTRAMUSCULAR; INTRAVENOUS; SUBCUTANEOUS ONCE
Refills: 0 | Status: COMPLETED | OUTPATIENT
Start: 2022-12-02 | End: 2022-12-02

## 2022-12-02 RX ORDER — PIPERACILLIN AND TAZOBACTAM 4; .5 G/20ML; G/20ML
3.38 INJECTION, POWDER, LYOPHILIZED, FOR SOLUTION INTRAVENOUS EVERY 6 HOURS
Refills: 0 | Status: DISCONTINUED | OUTPATIENT
Start: 2022-12-03 | End: 2022-12-03

## 2022-12-02 RX ADMIN — ENOXAPARIN SODIUM 40 MILLIGRAM(S): 100 INJECTION SUBCUTANEOUS at 23:35

## 2022-12-02 RX ADMIN — LISINOPRIL 10 MILLIGRAM(S): 2.5 TABLET ORAL at 06:37

## 2022-12-02 RX ADMIN — PIPERACILLIN AND TAZOBACTAM 200 GRAM(S): 4; .5 INJECTION, POWDER, LYOPHILIZED, FOR SOLUTION INTRAVENOUS at 16:46

## 2022-12-02 RX ADMIN — MORPHINE SULFATE 4 MILLIGRAM(S): 50 CAPSULE, EXTENDED RELEASE ORAL at 16:20

## 2022-12-02 RX ADMIN — Medication 650 MILLIGRAM(S): at 17:20

## 2022-12-02 RX ADMIN — ATORVASTATIN CALCIUM 40 MILLIGRAM(S): 80 TABLET, FILM COATED ORAL at 23:36

## 2022-12-02 RX ADMIN — MORPHINE SULFATE 4 MILLIGRAM(S): 50 CAPSULE, EXTENDED RELEASE ORAL at 11:47

## 2022-12-02 RX ADMIN — MORPHINE SULFATE 4 MILLIGRAM(S): 50 CAPSULE, EXTENDED RELEASE ORAL at 16:04

## 2022-12-02 RX ADMIN — SODIUM CHLORIDE 1000 MILLILITER(S): 9 INJECTION INTRAMUSCULAR; INTRAVENOUS; SUBCUTANEOUS at 16:08

## 2022-12-02 RX ADMIN — SODIUM CHLORIDE 1000 MILLILITER(S): 9 INJECTION INTRAMUSCULAR; INTRAVENOUS; SUBCUTANEOUS at 14:44

## 2022-12-02 RX ADMIN — MORPHINE SULFATE 4 MILLIGRAM(S): 50 CAPSULE, EXTENDED RELEASE ORAL at 12:05

## 2022-12-02 RX ADMIN — AMLODIPINE BESYLATE 5 MILLIGRAM(S): 2.5 TABLET ORAL at 23:35

## 2022-12-02 RX ADMIN — Medication 650 MILLIGRAM(S): at 16:07

## 2022-12-02 RX ADMIN — POTASSIUM PHOSPHATE, MONOBASIC POTASSIUM PHOSPHATE, DIBASIC 83.33 MILLIMOLE(S): 236; 224 INJECTION, SOLUTION INTRAVENOUS at 11:51

## 2022-12-02 NOTE — PROGRESS NOTE ADULT - PROBLEM SELECTOR PLAN 2
Patient with elevated BP likely i/s/o pain as reports compliance with home meds   -c/w Amlodipine 5 mg qd, lisinopril 10 mg qd   -pain control Patient with elevated BP likely i/s/o pain as reports compliance with home meds   -c/w amlodipine 5 mg qd, lisinopril 10 mg qd   -pain control

## 2022-12-02 NOTE — PROGRESS NOTE ADULT - PROBLEM SELECTOR PLAN 1
Patient with RUQ, epigastric, periumbillical pain and CT findings with gallbladder slude and worsening CBD dilation. No hx of fatty meal today and patient ate a salad before the pain began. Has received 12 IV morphine in ED with mild relief. Radiating to back and chest on initial presentation, however EKG unremarkable for cardiac ischemia   -GI consulted - appreciate recs   -f/u MRCP   -pain control with PRN tylenol and PRN morphine for breakthrough Patient with RUQ, epigastric, periumbillical pain and CT findings with gallbladder slude and worsening CBD dilation. No hx of fatty meal today and patient ate a salad before the pain began. Has received 12 IV morphine in ED with mild relief. Radiating to back and chest on initial presentation, however EKG unremarkable for cardiac ischemia   -GI consulted - appreciate recs   -consult surgery  -f/u MRCP   -pain control with PRN tylenol and PRN morphine for breakthrough Patient with RUQ, epigastric, periumbillical pain and CT findings with gallbladder slude and worsening CBD dilation. No hx of fatty meal today and patient ate a salad before the pain began. Has received 12 IV morphine in ED with mild relief. Radiating to back and chest on initial presentation, however EKG unremarkable for cardiac ischemia   New Tmax 102.5 on 12/2. Blood cultures sent.  - starting zosyn 3.375g q6h x7 days (12/2- ) for acute cholangitis treatment  - f/u blood cultures  - GI consulted, will follow recs   - surgery consulted, will follow recs  - f/u MRCP   - pain control with PRN tylenol and PRN morphine for breakthrough

## 2022-12-02 NOTE — PROGRESS NOTE ADULT - SUBJECTIVE AND OBJECTIVE BOX
GASTROENTEROLOGY PROGRESS NOTE  Patient seen and examined at bedside.   Tm .8  Leukocytosis to 13.8 today  LTs grossly normal with slight elevation in bili 1.3 today  Cont to c/o abd better controlled with pain medications    PERTINENT REVIEW OF SYSTEMS:  CONSTITUTIONAL: No weakness, fevers or chills  HEENT: No visual changes; No vertigo or throat pain   GASTROINTESTINAL: As above.  NEUROLOGICAL: No numbness or weakness  SKIN: No itching, burning, rashes, or lesions     Allergies    No Known Allergies    Intolerances      MEDICATIONS:  MEDICATIONS  (STANDING):  amLODIPine   Tablet 5 milliGRAM(s) Oral at bedtime  atorvastatin 40 milliGRAM(s) Oral at bedtime  enoxaparin Injectable 40 milliGRAM(s) SubCutaneous every 24 hours  lisinopril 10 milliGRAM(s) Oral daily    MEDICATIONS  (PRN):  acetaminophen     Tablet .. 650 milliGRAM(s) Oral every 6 hours PRN Moderate Pain (4 - 6)  morphine  - Injectable 4 milliGRAM(s) IV Push every 4 hours PRN Severe Pain (7 - 10)    Vital Signs Last 24 Hrs  T(C): 37 (02 Dec 2022 05:00), Max: 38.8 (01 Dec 2022 21:16)  T(F): 98.6 (02 Dec 2022 05:00), Max: 101.8 (01 Dec 2022 21:16)  HR: 86 (02 Dec 2022 05:00) (77 - 91)  BP: 131/74 (02 Dec 2022 05:00) (131/74 - 153/83)  BP(mean): --  RR: 18 (02 Dec 2022 05:00) (16 - 18)  SpO2: 96% (02 Dec 2022 05:00) (96% - 97%)    Parameters below as of 02 Dec 2022 05:00  Patient On (Oxygen Delivery Method): room air        PHYSICAL EXAM:    General: in no acute distress  HEENT: MMM, conjunctiva and sclera clear  Gastrointestinal: Soft RUQ tenderness non-distended; No rebound or guarding  Skin: Warm and dry. No obvious rash    LABS:                        11.8   13.76 )-----------( 221      ( 02 Dec 2022 08:39 )             38.0     12-02    134<L>  |  98  |  10  ----------------------------<  131<H>  3.6   |  25  |  0.88    Ca    9.3      02 Dec 2022 08:39  Phos  2.1       Mg     2.1         TPro  7.5  /  Alb  4.2  /  TBili  1.3<H>  /  DBili  x   /  AST  16  /  ALT  26  /  AlkPhos  68      PT/INR - ( 2022 17:25 )   PT: 12.4 sec;   INR: 1.04          PTT - ( 2022 17:25 )  PTT:29.1 sec      Urinalysis Basic - ( 2022 17:00 )    Color: Yellow / Appearance: Cloudy / S.015 / pH: x  Gluc: x / Ketone: NEGATIVE  / Bili: Negative / Urobili: 0.2 E.U./dL   Blood: x / Protein: NEGATIVE mg/dL / Nitrite: NEGATIVE   Leuk Esterase: NEGATIVE / RBC: x / WBC x   Sq Epi: x / Non Sq Epi: x / Bacteria: x                RADIOLOGY & ADDITIONAL STUDIES:  Reviewed

## 2022-12-02 NOTE — PROGRESS NOTE ADULT - PROBLEM SELECTOR PLAN 4
F: None   E: Replete as necessary K>4 Mg>2  N: DASH diet   DVT Prophylaxis: Lovenox 40mg daily   GI prophylaxis: None   CODE STATUS: FULL F: None   E: replenish as necessary K>4 Mg>2  N: DASH diet   DVT Prophylaxis: Lovenox 40mg q24h  GI prophylaxis: None   CODE STATUS: FULL

## 2022-12-02 NOTE — CONSULT NOTE ADULT - ASSESSMENT
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  Patient is admitted for severe RUQ and epigastric abdominal pain.  CT imaging (11/30/2022) in comparison to 06/2017 imaging demonstrated new multiple nondependent densities within the gallbladder (ddx polyps, tumors) in the absence of pericholecystic fluid, wall edema/thickening.  There was also severe steatosis, and increased CBD dilation (1.4 >1.6cm) in the absence of the stent.  Imaging is not consistent with acute cholecystitis although gallbladder abnormalities are observed.        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  Patient is admitted for severe RUQ and epigastric abdominal pain.  CT imaging (11/30/2022) in comparison to 06/2017 imaging demonstrated new multiple nondependent densities within the gallbladder (ddx polyps, tumors) in the absence of pericholecystic fluid, wall edema/thickening.  There was also severe steatosis, and increased CBD dilation (1.4 >1.6cm) in the absence of the stent.  Imaging is not consistent with acute cholecystitis although gallbladder abnormalities are observed.        PLAN  - No surgical indication for cholecystectomy at this point in time  - Recommend pain management  - Recommend RUQ ultrasound  - Follow-up MRCP  - Surgery Team 4C will continue to follow  - Please page Team 4 at [phone] with any questions and/or clinical changes

## 2022-12-02 NOTE — PROGRESS NOTE ADULT - ASSESSMENT
63 y/o F with PMHx distal CBD stricture s/p stent placement on 6/8/2017 (on amlodipine) and HLD (on rosuvastatin) presents to ED c/o epigastric and right sided abdominal pain that began suddenly 11/30 found to have new multiple nondependent densities in gallbladder and new lymph node. GI consulted for further evaluation.    #Abdominal pain  #Hx of CBD strictures  #Abnormal gallbladder imaging  CT AP 11/30:   1. Since June 12, 2017, new multiple nondependent densities in gallbladder, polyps or dallas affective adherent sludge. New prominent gastrohepatic ligament lymph node. Recommend MRI without and with venous contrast for further evaluation.  No acute cholecystitis.  2. No intrahepatic biliary dilatation. Increased common bile duct dilatation, post stent removal.  3. No suspicious liver lesion. Severe hepatic steatosis.    LTs wnl    Recommendations:  - Obtain MRCP  - Surgery consult for abnormal gallbladder on CT  - Infectious w/u per primary team     Enedelia Neville MD  Gastroenterology Fellow, PGY -5   Weekday Pager [phone]

## 2022-12-02 NOTE — CONSULT NOTE ADULT - SUBJECTIVE AND OBJECTIVE BOX
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  Patient is admitted for severe RUQ and epigastric abdominal pain 10/10 prior to admission improved only to 7/10 with Morphine administration.  The pain is described as intermittently altering between dull and constant with radiation to the back.  The pain is associated with nausea, chills and headache.  Her last bowel movement on Wednesday (11/30/2022) was normal and nonbloody.  CT imaging (11/30/2022) in comparison to 06/2017 imaging demonstrated new multiple nondependent densities within the gallbladder (ddx polyps, tumors) in the absence of pericholecystic fluid, wall edema/thickening.  There was also severe steatosis, and increased CBD dilation (1.4 >1.6cm) in the absence of the stent.      ROS  Constitutional: (-) fever, (+) chills  Eyes/ENT: (-) blurry vision, (-) dizziness  Cardiovascular: (-) chest pain, (-) palpitations  Respiratory: (-) cough, (-) shortness of breath  Gastrointestinal: (+) abdominal pain, (+) nausea, (-) vomiting, (-) diarrhea (-) constipation  Neurological: (-) headache        PAST MEDICAL & SURGICAL HISTORY:  - Ampullary stenosis   - Borderline Diabetes Mellitus  - Bronchitis  - Common Bile Duct Stenosis (stricture)   - Hyperlipidemia  - Hemorrhoids  - Hypertension  - Obesity   - Vitamin D Deficiency      PRIOR SURGERY HISTORY:  - Common Bile Duct Stent Placement   - Common Bile Duct Stent Removal   - ERCP Sphincterotomy    MEDICATIONS (HOME):  - Amlodipine-Benzaprili 5-10mg QD  - Rosuvastatin 10mg QD      MEDICATIONS  (STANDING):  amLODIPine   Tablet 5 milliGRAM(s) Oral at bedtime  atorvastatin 40 milliGRAM(s) Oral at bedtime  enoxaparin Injectable 40 milliGRAM(s) SubCutaneous every 24 hours  lisinopril 10 milliGRAM(s) Oral daily    MEDICATIONS  (PRN):  acetaminophen     Tablet .. 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  She quit smoking cigarettes ~10 years ago after smoking 0.5 packs/day for 30 years (15py).  She endorses EtOH consumption (margaritas) 1-2 per week and denies illicit/recreational drug use.      T(C): 38.9 (12-02-22 @ 12:13), Max: 38.9 (12-02-22 @ 12:13)  HR: 88 (12-02-22 @ 12:13) (77 - 91)  BP: 129/73 (12-02-22 @ 12:13) (129/73 - 153/83)  RR: 18 (12-02-22 @ 12:13) (16 - 18)  SpO2: 93% (12-02-22 @ 12:13) (93% - 97%)    GENERAL: Moderately uncomfortable in bed moaning, awake, opens eyes spontaneously  HEENT: NCAT, MMM, Normal conjunctiva  RESP: Nonlabored breathing on room air, No respiratory distress  CARD: Normal rate, Normal peripheral perfusion  GI: Soft, nondistended, protuberant, moderately tender abdomen (worst in RUQ and epigastric region), No rebound tenderness  EXTREM: WWP, No edema, No gross deformity of extremities, RLE tenderness to palpation  SKIN:  BLE ankle darkening of the skin  NEURO: Awake and alert, No focal motor or sensory deficits      LABS:                        11.8   13.76 )-----------( 221      ( 02 Dec 2022 08:39 )             38.0     12-02    134<L>  |  98  |  10  ----------------------------<  131<H>  3.6   |  25  |  0.88    Ca    9.3      02 Dec 2022 08:39  Phos  2.1     12-02  Mg     2.1     12-02    TPro  7.5  /  Alb  4.2  /  TBili  1.3<H>  /  DBili  x   /  AST  16  /  ALT  26  /  AlkPhos  68  12-02    PT/INR - ( 30 Nov 2022 17:25 )   PT: 12.4 sec;   INR: 1.04          PTT - ( 30 Nov 2022 17:25 )  PTT:29.1 sec  LIVER FUNCTIONS - ( 02 Dec 2022 08:39 )  Alb: 4.2 g/dL / Pro: 7.5 g/dL / ALK PHOS: 68 U/L / ALT: 26 U/L / AST: 16 U/L / GGT: x             RADIOLOGY & ADDITIONAL STUDIES:  CT Abdomen and Pelvis w/ IV Cont:   ACC: [id number] EXAM:  CT ABDOMEN AND PELVIS IC                          PROCEDURE DATE:  11/30/2022          INTERPRETATION:  CLINICAL INFORMATION: Right upper quadrant pain, post   CBD stent placement for stricture.    COMPARISON: CT abdomen pelvisJune 12, 2017 and MRI abdomen December 11, 2015    CONTRAST/COMPLICATIONS:  IV Contrast: Isovue 370  95 cc administered   5 cc discarded  Oral Contrast: NONE  Complications: None reported at time of study completion    PROCEDURE:  CT of the Abdomenand Pelvis was performed.  Sagittal and coronal reformats were performed.    FINDINGS:  LOWER CHEST: Within normal limits.    HEPATOBILIARY: Severe steatosis areas of fatty sparing. No suspicious   liver lesion. No intrahepatic biliary dilatation. Previously described   common bile duct stent removed. Increased common bile duct dilatation,   similar to baseline MRI in 2015, up to 1.6 cm, previously 1.4 cm.   Gallbladder multifocal nondependent densities, involving neck (4, 96),   body and fundus (4, 96; 4, 100), polyps or dallas affective adherent sludge.   New prominent lymph node in gallbladder fossa (3, 48), 1.1 cm. No   pericholecystic fluid or wall edema/thickening right  SPLEEN: Within normal limits.  PANCREAS: Within normal limits.  ADRENALS: Within normal limits.  KIDNEYS/URETERS: Few cortical cysts.    BLADDER: Within normal limits.  REPRODUCTIVE ORGANS: Leiomyomatous uterus    BOWEL: No bowel obstruction.  PERITONEUM: No ascites.    RETROPERITONEUM/LYMPH NODES: Gallbladder fossa/gastrohepatic ligament   lymph node as above, 1.1 cm.    BONES AND SOFT TISSUES: Suspicious lesion. Diastasis recti.    IMPRESSION:    1. Since June 12, 2017, new multiple nondependent densities in   gallbladder, polyps or d[place] affective adherent sludge.New prominent   gastrohepatic ligament lymph node. Recommend MRI without and with venous   contrast for further evaluation.  No acute cholecystitis.  2. No intrahepatic biliary dilatation. Increased common bile duct   dilatation, post stent removal.  3. No suspicious liver lesion. Severe hepatic steatosis.        --- End of Report ---          LUCI HUSAIN MD; Attending Radiologist  This document has been electronically signed.  LUCI HUSAIN MD; Attending Radiologist  This document has been electronically signed. Nov 30 2022  7:52PM (11-30-22 @ 19:24)        
GASTROENTEROLOGY CONSULT NOTE  HPI:  63 y/o F with PMHx distal CBD stricture s/p stent placement on 6/8/2017 by Dr. Gutierrez, HTN, and HLD (on rosuvastatin) presents to ED c/o epigastric and right sided abdominal pain that began suddenly at 2:30pm today while pt was at work. Pain radiates to lower abdomen and to her back. Never had pain like this before. Also with associated mild SOB, nausea and vomiting. Pt currently c/o feeling feverish with chills. Denies cough, congestion, diarrhea, hematuria, or rash. Last BM yesterday.    In ED patient received 4mg x2 morphine and 8mg x 1 morphine.   CT showing new multiple nondependent densities in gallbladder, polyps affective adherent sludge. Worsening CBD dilation since prior exam.     Last EGD/ colonoscopy by Dr. Orozco 1/2022: 1 TA in sigmoid, 1 hyperplastic polyp. Due for repeat 5 years. At time of visit, recommended MRCP due to hx of CBD stricture; however, never completed.    Allergies    No Known Allergies    Intolerances      Home Medications:  AMLODIPINE BESY-BENAZEPRIL HCL 5-10MG CAPSULE: &#x27;TAKE 1 CAPSULE DAILY (30 Nov 2022 21:41)  ROSUVASTATIN CALCIUM 10MG TABLET: 1 TABLET ONCE A DAY (30 Nov 2022 21:41)    MEDICATIONS:  MEDICATIONS  (STANDING):  amLODIPine   Tablet 5 milliGRAM(s) Oral at bedtime  atorvastatin 40 milliGRAM(s) Oral at bedtime  enoxaparin Injectable 40 milliGRAM(s) SubCutaneous every 24 hours  ketorolac   Injectable 30 milliGRAM(s) IV Push every 8 hours  lisinopril 10 milliGRAM(s) Oral daily    MEDICATIONS  (PRN):  morphine  - Injectable 4 milliGRAM(s) IV Push every 4 hours PRN Severe Pain (7 - 10)    PAST MEDICAL & SURGICAL HISTORY:  HTN (hypertension)      Obesity      Common bile duct (CBD) stricture        FAMILY HISTORY:    SOCIAL HISTORY:  Tobacco: [ ] Current, [ ] Former, [ ] Never; Pack Years:  Alcohol:  Illicit Drugs:    REVIEW OF SYSTEMS:  CONSTITUTIONAL: No weakness, fevers or chills  HEENT: No visual changes; No vertigo or throat pain   NECK: No pain or stiffness  RESPIRATORY: No cough, wheezing, hemoptysis; No shortness of breath  CARDIOVASCULAR: No chest pain or palpitations  GASTROINTESTINAL: As above.  GENITOURINARY: No dysuria, frequency or hematuria  NEUROLOGICAL: No numbness or weakness  SKIN: No itching, burning, rashes, or lesions   All other 10 review of systems is negative unless indicated above.    Vital Signs Last 24 Hrs  T(C): 36.9 (01 Dec 2022 05:48), Max: 37.3 (30 Nov 2022 19:38)  T(F): 98.4 (01 Dec 2022 05:48), Max: 99.1 (30 Nov 2022 19:38)  HR: 66 (01 Dec 2022 05:48) (64 - 84)  BP: 142/82 (01 Dec 2022 05:48) (142/82 - 192/94)  BP(mean): --  RR: 16 (01 Dec 2022 05:48) (14 - 18)  SpO2: 98% (01 Dec 2022 05:48) (92% - 98%)    Parameters below as of 01 Dec 2022 05:48  Patient On (Oxygen Delivery Method): room air          PHYSICAL EXAM:    General: in no acute distress  Eyes: Anicteric sclerae, moist conjunctivae  HENT: Moist mucous membranes  Neck: Trachea midline, supple  Lungs: Normal respiratory effort, no intercostal retractions  Cardiovascular: RRR  Abdomen: Soft, right sided abdominal tenderness non-distended; No rebound or guarding  Extremities: Normal range of motion, No clubbing, cyanosis or edema  Neurological: Alert and oriented x3  Skin: Warm and dry. No obvious rash    LABS:                        11.8   10.27 )-----------( 236      ( 01 Dec 2022 05:56 )             37.9     12-01    138  |  99  |  11  ----------------------------<  142<H>  4.1   |  27  |  0.76    Ca    9.8      01 Dec 2022 05:56  Phos  3.6     12-01  Mg     2.2     12-01    TPro  7.9  /  Alb  4.6  /  TBili  0.6  /  DBili  x   /  AST  26  /  ALT  37  /  AlkPhos  78  12-01        PT/INR - ( 30 Nov 2022 17:25 )   PT: 12.4 sec;   INR: 1.04          PTT - ( 30 Nov 2022 17:25 )  PTT:29.1 sec    RADIOLOGY & ADDITIONAL STUDIES:     Reviewed

## 2022-12-02 NOTE — PROGRESS NOTE ADULT - SUBJECTIVE AND OBJECTIVE BOX
OVERNIGHT EVENTS:     SUBJECTIVE / INTERVAL HPI: Patient seen and examined at bedside.     VITAL SIGNS:  Vital Signs Last 24 Hrs  T(C): 37 (02 Dec 2022 05:00), Max: 38.8 (01 Dec 2022 21:16)  T(F): 98.6 (02 Dec 2022 05:00), Max: 101.8 (01 Dec 2022 21:16)  HR: 86 (02 Dec 2022 05:00) (70 - 91)  BP: 131/74 (02 Dec 2022 05:00) (131/74 - 153/83)  BP(mean): --  RR: 18 (02 Dec 2022 05:00) (16 - 18)  SpO2: 96% (02 Dec 2022 05:00) (95% - 97%)    Parameters below as of 02 Dec 2022 05:00  Patient On (Oxygen Delivery Method): room air        PHYSICAL EXAM:  General:   HEENT:   Neck:   Cardiovascular:  Respiratory:   Gastrointestinal:   Extremities:   Vascular:   Neurological:     MEDICATIONS:  MEDICATIONS  (STANDING):  amLODIPine   Tablet 5 milliGRAM(s) Oral at bedtime  atorvastatin 40 milliGRAM(s) Oral at bedtime  enoxaparin Injectable 40 milliGRAM(s) SubCutaneous every 24 hours  lisinopril 10 milliGRAM(s) Oral daily  potassium phosphate IVPB 30 milliMole(s) IV Intermittent once    MEDICATIONS  (PRN):  acetaminophen     Tablet .. 650 milliGRAM(s) Oral every 6 hours PRN Moderate Pain (4 - 6)  morphine  - Injectable 4 milliGRAM(s) IV Push every 4 hours PRN Severe Pain (7 - 10)      ALLERGIES:  Allergies    No Known Allergies    Intolerances        LABS:                        11.8   13.76 )-----------( 221      ( 02 Dec 2022 08:39 )             38.0     12-02    134<L>  |  98  |  10  ----------------------------<  131<H>  3.6   |  25  |  0.88    Ca    9.3      02 Dec 2022 08:39  Phos  2.1     12-02  Mg     2.1     12-02    TPro  7.5  /  Alb  4.2  /  TBili  1.3<H>  /  DBili  x   /  AST  16  /  ALT  26  /  AlkPhos  68  12-02    PT/INR - ( 2022 17:25 )   PT: 12.4 sec;   INR: 1.04          PTT - ( 2022 17:25 )  PTT:29.1 sec  Urinalysis Basic - ( 2022 17:00 )    Color: Yellow / Appearance: Cloudy / S.015 / pH: x  Gluc: x / Ketone: NEGATIVE  / Bili: Negative / Urobili: 0.2 E.U./dL   Blood: x / Protein: NEGATIVE mg/dL / Nitrite: NEGATIVE   Leuk Esterase: NEGATIVE / RBC: x / WBC x   Sq Epi: x / Non Sq Epi: x / Bacteria: x      CAPILLARY BLOOD GLUCOSE          RADIOLOGY & ADDITIONAL TESTS: Reviewed.   OVERNIGHT EVENTS: No acute events.    SUBJECTIVE / INTERVAL HPI: Patient seen and examined at bedside. Reports significant discomfort due to RUQ/epigastric pain, rated 7/10, improved with pain regimen. Endorses fever, chills, nausea; denies vomiting. ROS otherwise negative.    VITAL SIGNS:  Vital Signs Last 24 Hrs   T(C): 37 (02 Dec 2022 05:00), Max: 38.8 (01 Dec 2022 21:16)  T(F): 98.6 (02 Dec 2022 05:00), Max: 101.8 (01 Dec 2022 21:16)  HR: 86 (02 Dec 2022 05:00) (70 - 91)  BP: 131/74 (02 Dec 2022 05:00) (131/74 - 153/83)  BP(mean): --  RR: 18 (02 Dec 2022 05:00) (16 - 18)  SpO2: 96% (02 Dec 2022 05:00) (95% - 97%)    Parameters below as of 02 Dec 2022 05:00  Patient On (Oxygen Delivery Method): room air    PHYSICAL EXAM:  Constitutional: resting in bed; discomfort due to pain  HEENT: NC/AT, PERRL, EOMI, anicteric sclera   Neck: supple; no JVD or thyromegaly  Respiratory: CTA B/L; no W/Rhonchi/Crackles, no retractions or use of accessory muscles   Cardiac: +S1/S2; no M/R/G  Gastrointestinal: +TTP in RUQ/RLQ/epigastric area, +guarding, soft, ND, no rebound/rigidity, +BSx4  Extremities: WWP, no clubbing or cyanosis; no peripheral edema  Vascular: 2+ radial pulses B/L  Dermatologic: skin warm, dry and intact; no rashes, wounds, or scars  Neurologic: AAOx3; CNII-XII grossly intact; no focal deficits    MEDICATIONS:  MEDICATIONS  (STANDING):  amLODIPine   Tablet 5 milliGRAM(s) Oral at bedtime  atorvastatin 40 milliGRAM(s) Oral at bedtime  enoxaparin Injectable 40 milliGRAM(s) SubCutaneous every 24 hours  lisinopril 10 milliGRAM(s) Oral daily  potassium phosphate IVPB 30 milliMole(s) IV Intermittent once    MEDICATIONS  (PRN):  acetaminophen     Tablet .. 650 milliGRAM(s) Oral every 6 hours PRN Moderate Pain (4 - 6)  morphine  - Injectable 4 milliGRAM(s) IV Push every 4 hours PRN Severe Pain (7 - 10)    ALLERGIES:  No Known Allergies    LABS:             11.8   13.76 )-----------( 221      ( 02 Dec 2022 08:39 )             38.0     12-02    134<L>  |  98  |  10  ----------------------------<  131<H>  3.6   |  25  |  0.88    Ca    9.3      02 Dec 2022 08:39  Phos  2.1     12-  Mg     2.1     12-    TPro  7.5  /  Alb  4.2  /  TBili  1.3<H>  /  DBili  x   /  AST  16  /  ALT  26  /  AlkPhos  68  12-02    PT/INR - ( 2022 17:25 )   PT: 12.4 sec;   INR: 1.04     PTT - ( 2022 17:25 )  PTT:29.1 sec  Urinalysis Basic - ( 2022 17:00 )    Color: Yellow / Appearance: Cloudy / S.015 / pH: x  Gluc: x / Ketone: NEGATIVE  / Bili: Negative / Urobili: 0.2 E.U./dL   Blood: x / Protein: NEGATIVE mg/dL / Nitrite: NEGATIVE   Leuk Esterase: NEGATIVE / RBC: x / WBC x   Sq Epi: x / Non Sq Epi: x / Bacteria: x      CAPILLARY BLOOD GLUCOSE          RADIOLOGY & ADDITIONAL TESTS: Reviewed.

## 2022-12-02 NOTE — PROGRESS NOTE ADULT - ASSESSMENT
61 y/o F with PMHx distal CBD stricture s/p stent placement on 6/8/2017 (on amlodipine) and HLD (on rosuvastatin) presents to ED c/o epigastric and right sided abdominal pain and admitted for abdominal pain likely 2/2 cholelithiasis vs biliary colic  63 y/o F with PMHx distal CBD stricture s/p stent placement on 6/8/2017 (on amlodipine) and HLD (on rosuvastatin) presents to ED c/o epigastric and right sided abdominal pain and admitted for abdominal pain likely 2/2 cholangitis vs biliary colic.

## 2022-12-03 ENCOUNTER — TRANSCRIPTION ENCOUNTER (OUTPATIENT)
Age: 62
End: 2022-12-03

## 2022-12-03 LAB
ALBUMIN SERPL ELPH-MCNC: 3.4 G/DL — SIGNIFICANT CHANGE UP (ref 3.3–5)
ALBUMIN SERPL ELPH-MCNC: 3.4 G/DL — SIGNIFICANT CHANGE UP (ref 3.3–5)
ALP SERPL-CCNC: 66 U/L — SIGNIFICANT CHANGE UP (ref 40–120)
ALP SERPL-CCNC: 79 U/L — SIGNIFICANT CHANGE UP (ref 40–120)
ALT FLD-CCNC: 122 U/L — HIGH (ref 10–45)
ALT FLD-CCNC: 23 U/L — SIGNIFICANT CHANGE UP (ref 10–45)
ANION GAP SERPL CALC-SCNC: 13 MMOL/L — SIGNIFICANT CHANGE UP (ref 5–17)
ANION GAP SERPL CALC-SCNC: 15 MMOL/L — SIGNIFICANT CHANGE UP (ref 5–17)
AST SERPL-CCNC: 203 U/L — HIGH (ref 10–40)
AST SERPL-CCNC: 23 U/L — SIGNIFICANT CHANGE UP (ref 10–40)
BILIRUB SERPL-MCNC: 0.8 MG/DL — SIGNIFICANT CHANGE UP (ref 0.2–1.2)
BILIRUB SERPL-MCNC: 1.2 MG/DL — SIGNIFICANT CHANGE UP (ref 0.2–1.2)
BLD GP AB SCN SERPL QL: NEGATIVE — SIGNIFICANT CHANGE UP
BLD GP AB SCN SERPL QL: NEGATIVE — SIGNIFICANT CHANGE UP
BUN SERPL-MCNC: 7 MG/DL — SIGNIFICANT CHANGE UP (ref 7–23)
BUN SERPL-MCNC: 7 MG/DL — SIGNIFICANT CHANGE UP (ref 7–23)
CALCIUM SERPL-MCNC: 8.5 MG/DL — SIGNIFICANT CHANGE UP (ref 8.4–10.5)
CALCIUM SERPL-MCNC: 9.2 MG/DL — SIGNIFICANT CHANGE UP (ref 8.4–10.5)
CHLORIDE SERPL-SCNC: 103 MMOL/L — SIGNIFICANT CHANGE UP (ref 96–108)
CHLORIDE SERPL-SCNC: 104 MMOL/L — SIGNIFICANT CHANGE UP (ref 96–108)
CO2 SERPL-SCNC: 20 MMOL/L — LOW (ref 22–31)
CO2 SERPL-SCNC: 22 MMOL/L — SIGNIFICANT CHANGE UP (ref 22–31)
CREAT SERPL-MCNC: 0.72 MG/DL — SIGNIFICANT CHANGE UP (ref 0.5–1.3)
CREAT SERPL-MCNC: 0.76 MG/DL — SIGNIFICANT CHANGE UP (ref 0.5–1.3)
EGFR: 89 ML/MIN/1.73M2 — SIGNIFICANT CHANGE UP
EGFR: 94 ML/MIN/1.73M2 — SIGNIFICANT CHANGE UP
GLUCOSE BLDC GLUCOMTR-MCNC: 108 MG/DL — HIGH (ref 70–99)
GLUCOSE BLDC GLUCOMTR-MCNC: 115 MG/DL — HIGH (ref 70–99)
GLUCOSE SERPL-MCNC: 122 MG/DL — HIGH (ref 70–99)
GLUCOSE SERPL-MCNC: 150 MG/DL — HIGH (ref 70–99)
HCT VFR BLD CALC: 34.7 % — SIGNIFICANT CHANGE UP (ref 34.5–45)
HCT VFR BLD CALC: 39.5 % — SIGNIFICANT CHANGE UP (ref 34.5–45)
HGB BLD-MCNC: 10.6 G/DL — LOW (ref 11.5–15.5)
HGB BLD-MCNC: 12.1 G/DL — SIGNIFICANT CHANGE UP (ref 11.5–15.5)
LIDOCAIN IGE QN: 29 U/L — SIGNIFICANT CHANGE UP (ref 7–60)
MAGNESIUM SERPL-MCNC: 1.9 MG/DL — SIGNIFICANT CHANGE UP (ref 1.6–2.6)
MAGNESIUM SERPL-MCNC: 2.2 MG/DL — SIGNIFICANT CHANGE UP (ref 1.6–2.6)
MCHC RBC-ENTMCNC: 27.3 PG — SIGNIFICANT CHANGE UP (ref 27–34)
MCHC RBC-ENTMCNC: 27.6 PG — SIGNIFICANT CHANGE UP (ref 27–34)
MCHC RBC-ENTMCNC: 30.5 GM/DL — LOW (ref 32–36)
MCHC RBC-ENTMCNC: 30.6 GM/DL — LOW (ref 32–36)
MCV RBC AUTO: 89.4 FL — SIGNIFICANT CHANGE UP (ref 80–100)
MCV RBC AUTO: 90 FL — SIGNIFICANT CHANGE UP (ref 80–100)
NRBC # BLD: 0 /100 WBCS — SIGNIFICANT CHANGE UP (ref 0–0)
NRBC # BLD: 0 /100 WBCS — SIGNIFICANT CHANGE UP (ref 0–0)
PHOSPHATE SERPL-MCNC: 2.1 MG/DL — LOW (ref 2.5–4.5)
PHOSPHATE SERPL-MCNC: 2.6 MG/DL — SIGNIFICANT CHANGE UP (ref 2.5–4.5)
PLATELET # BLD AUTO: 193 K/UL — SIGNIFICANT CHANGE UP (ref 150–400)
PLATELET # BLD AUTO: 194 K/UL — SIGNIFICANT CHANGE UP (ref 150–400)
POTASSIUM SERPL-MCNC: 3.6 MMOL/L — SIGNIFICANT CHANGE UP (ref 3.5–5.3)
POTASSIUM SERPL-MCNC: 3.8 MMOL/L — SIGNIFICANT CHANGE UP (ref 3.5–5.3)
POTASSIUM SERPL-SCNC: 3.6 MMOL/L — SIGNIFICANT CHANGE UP (ref 3.5–5.3)
POTASSIUM SERPL-SCNC: 3.8 MMOL/L — SIGNIFICANT CHANGE UP (ref 3.5–5.3)
PROT SERPL-MCNC: 6.7 G/DL — SIGNIFICANT CHANGE UP (ref 6–8.3)
PROT SERPL-MCNC: 7.4 G/DL — SIGNIFICANT CHANGE UP (ref 6–8.3)
RAPID RVP RESULT: SIGNIFICANT CHANGE UP
RBC # BLD: 3.88 M/UL — SIGNIFICANT CHANGE UP (ref 3.8–5.2)
RBC # BLD: 4.39 M/UL — SIGNIFICANT CHANGE UP (ref 3.8–5.2)
RBC # FLD: 15.3 % — HIGH (ref 10.3–14.5)
RBC # FLD: 15.4 % — HIGH (ref 10.3–14.5)
RH IG SCN BLD-IMP: POSITIVE — SIGNIFICANT CHANGE UP
RH IG SCN BLD-IMP: POSITIVE — SIGNIFICANT CHANGE UP
SARS-COV-2 RNA SPEC QL NAA+PROBE: SIGNIFICANT CHANGE UP
SODIUM SERPL-SCNC: 138 MMOL/L — SIGNIFICANT CHANGE UP (ref 135–145)
SODIUM SERPL-SCNC: 139 MMOL/L — SIGNIFICANT CHANGE UP (ref 135–145)
WBC # BLD: 14.63 K/UL — HIGH (ref 3.8–10.5)
WBC # BLD: 15.03 K/UL — HIGH (ref 3.8–10.5)
WBC # FLD AUTO: 14.63 K/UL — HIGH (ref 3.8–10.5)
WBC # FLD AUTO: 15.03 K/UL — HIGH (ref 3.8–10.5)

## 2022-12-03 PROCEDURE — 99232 SBSQ HOSP IP/OBS MODERATE 35: CPT

## 2022-12-03 PROCEDURE — 99233 SBSQ HOSP IP/OBS HIGH 50: CPT

## 2022-12-03 PROCEDURE — 47562 LAPAROSCOPIC CHOLECYSTECTOMY: CPT | Mod: 22

## 2022-12-03 RX ORDER — ACETAMINOPHEN 500 MG
650 TABLET ORAL EVERY 6 HOURS
Refills: 0 | Status: DISCONTINUED | OUTPATIENT
Start: 2022-12-03 | End: 2022-12-05

## 2022-12-03 RX ORDER — OXYCODONE HYDROCHLORIDE 5 MG/1
10 TABLET ORAL EVERY 4 HOURS
Refills: 0 | Status: DISCONTINUED | OUTPATIENT
Start: 2022-12-03 | End: 2022-12-05

## 2022-12-03 RX ORDER — OXYCODONE HYDROCHLORIDE 5 MG/1
5 TABLET ORAL EVERY 4 HOURS
Refills: 0 | Status: DISCONTINUED | OUTPATIENT
Start: 2022-12-03 | End: 2022-12-05

## 2022-12-03 RX ORDER — ACETAMINOPHEN 500 MG
1000 TABLET ORAL ONCE
Refills: 0 | Status: COMPLETED | OUTPATIENT
Start: 2022-12-03 | End: 2022-12-04

## 2022-12-03 RX ORDER — HYDROMORPHONE HYDROCHLORIDE 2 MG/ML
0.5 INJECTION INTRAMUSCULAR; INTRAVENOUS; SUBCUTANEOUS ONCE
Refills: 0 | Status: DISCONTINUED | OUTPATIENT
Start: 2022-12-03 | End: 2022-12-03

## 2022-12-03 RX ORDER — ACETAMINOPHEN 500 MG
1000 TABLET ORAL ONCE
Refills: 0 | Status: DISCONTINUED | OUTPATIENT
Start: 2022-12-03 | End: 2022-12-03

## 2022-12-03 RX ORDER — ONDANSETRON 8 MG/1
4 TABLET, FILM COATED ORAL EVERY 6 HOURS
Refills: 0 | Status: DISCONTINUED | OUTPATIENT
Start: 2022-12-03 | End: 2022-12-05

## 2022-12-03 RX ORDER — POTASSIUM CHLORIDE 20 MEQ
20 PACKET (EA) ORAL
Refills: 0 | Status: COMPLETED | OUTPATIENT
Start: 2022-12-03 | End: 2022-12-03

## 2022-12-03 RX ORDER — HYDROMORPHONE HYDROCHLORIDE 2 MG/ML
0.25 INJECTION INTRAMUSCULAR; INTRAVENOUS; SUBCUTANEOUS ONCE
Refills: 0 | Status: DISCONTINUED | OUTPATIENT
Start: 2022-12-03 | End: 2022-12-03

## 2022-12-03 RX ORDER — SODIUM CHLORIDE 9 MG/ML
1000 INJECTION, SOLUTION INTRAVENOUS
Refills: 0 | Status: DISCONTINUED | OUTPATIENT
Start: 2022-12-03 | End: 2022-12-04

## 2022-12-03 RX ADMIN — Medication 650 MILLIGRAM(S): at 12:21

## 2022-12-03 RX ADMIN — HYDROMORPHONE HYDROCHLORIDE 0.5 MILLIGRAM(S): 2 INJECTION INTRAMUSCULAR; INTRAVENOUS; SUBCUTANEOUS at 22:20

## 2022-12-03 RX ADMIN — OXYCODONE HYDROCHLORIDE 10 MILLIGRAM(S): 5 TABLET ORAL at 20:26

## 2022-12-03 RX ADMIN — MORPHINE SULFATE 4 MILLIGRAM(S): 50 CAPSULE, EXTENDED RELEASE ORAL at 07:28

## 2022-12-03 RX ADMIN — Medication 650 MILLIGRAM(S): at 03:15

## 2022-12-03 RX ADMIN — PIPERACILLIN AND TAZOBACTAM 25 GRAM(S): 4; .5 INJECTION, POWDER, LYOPHILIZED, FOR SOLUTION INTRAVENOUS at 02:52

## 2022-12-03 RX ADMIN — Medication 650 MILLIGRAM(S): at 02:41

## 2022-12-03 RX ADMIN — ATORVASTATIN CALCIUM 40 MILLIGRAM(S): 80 TABLET, FILM COATED ORAL at 21:59

## 2022-12-03 RX ADMIN — MORPHINE SULFATE 4 MILLIGRAM(S): 50 CAPSULE, EXTENDED RELEASE ORAL at 08:00

## 2022-12-03 RX ADMIN — AMLODIPINE BESYLATE 5 MILLIGRAM(S): 2.5 TABLET ORAL at 21:59

## 2022-12-03 RX ADMIN — OXYCODONE HYDROCHLORIDE 10 MILLIGRAM(S): 5 TABLET ORAL at 21:25

## 2022-12-03 RX ADMIN — Medication 20 MILLIEQUIVALENT(S): at 12:21

## 2022-12-03 RX ADMIN — PIPERACILLIN AND TAZOBACTAM 25 GRAM(S): 4; .5 INJECTION, POWDER, LYOPHILIZED, FOR SOLUTION INTRAVENOUS at 08:41

## 2022-12-03 RX ADMIN — HYDROMORPHONE HYDROCHLORIDE 0.5 MILLIGRAM(S): 2 INJECTION INTRAMUSCULAR; INTRAVENOUS; SUBCUTANEOUS at 18:26

## 2022-12-03 RX ADMIN — Medication 20 MILLIEQUIVALENT(S): at 08:40

## 2022-12-03 RX ADMIN — HYDROMORPHONE HYDROCHLORIDE 0.5 MILLIGRAM(S): 2 INJECTION INTRAMUSCULAR; INTRAVENOUS; SUBCUTANEOUS at 21:59

## 2022-12-03 RX ADMIN — PIPERACILLIN AND TAZOBACTAM 25 GRAM(S): 4; .5 INJECTION, POWDER, LYOPHILIZED, FOR SOLUTION INTRAVENOUS at 18:21

## 2022-12-03 RX ADMIN — LISINOPRIL 10 MILLIGRAM(S): 2.5 TABLET ORAL at 06:45

## 2022-12-03 NOTE — BRIEF OPERATIVE NOTE - OPERATION/FINDINGS
Gallbladder inflamed/gangrenous. Top down approach taken, with a large amount of chronically inflamed/fibrotic tissue. Ductal structures isolated and critical view of safety achieved, cystic duct/artery both clipped and divided. Hemostasis achieved. Fascia and skin closed primarily. JPx1 placed.

## 2022-12-03 NOTE — PROGRESS NOTE ADULT - ASSESSMENT
63 y/o F with PMHx distal CBD stricture s/p stent placement on 6/8/2017 (on amlodipine) and HLD (on rosuvastatin) presents to ED c/o epigastric and right sided abdominal pain that began suddenly 11/30 found to have new multiple nondependent densities in gallbladder and new lymph node. GI consulted for further evaluation.    #Abdominal pain  #Hx of CBD strictures  #Abnormal gallbladder imaging  CT AP 11/30:   1. Since June 12, 2017, new multiple nondependent densities in gallbladder, polyps or d[place] affective adherent sludge. New prominent gastrohepatic ligament lymph node. Recommend MRI without and with venous contrast for further evaluation.  No acute cholecystitis.  2. No intrahepatic biliary dilatation. Increased common bile duct dilatation, post stent removal.  3. No suspicious liver lesion. Severe hepatic steatosis.    RUQ US 12/2  - acute cholecystitis  - 1.3cm gallbladder polyp  - cbd 9mm    LTs wnl    Recommendations:  - Monitor wbc, lts, fever curve  - F/u MRCP  - Surgery consult for cholecystitis/ gallbladder polyp  - Cont Zosyn  - consider liver bx for further evaluation of etiology of cbd stricture    Enedelia Neville MD  Gastroenterology Fellow, PGY -5   Weekday Pager [phone] 63 y/o F with PMHx distal CBD stricture s/p stent placement on 6/8/2017 (on amlodipine) and HLD (on rosuvastatin) presents to ED c/o epigastric and right sided abdominal pain that began suddenly 11/30 found to have new multiple nondependent densities in gallbladder and new lymph node. GI consulted for further evaluation.    #Abdominal pain  #Hx of CBD strictures  #Abnormal gallbladder imaging  CT AP 11/30:   1. Since June 12, 2017, new multiple nondependent densities in gallbladder, polyps or d[place] affective adherent sludge. New prominent gastrohepatic ligament lymph node. Recommend MRI without and with venous contrast for further evaluation.  No acute cholecystitis.  2. No intrahepatic biliary dilatation. Increased common bile duct dilatation, post stent removal.  3. No suspicious liver lesion. Severe hepatic steatosis.    RUQ US 12/2  - acute cholecystitis  - 1.3cm gallbladder polyp  - cbd 9mm    LTs wnl    Recommendations:  - Monitor wbc, lts, fever curve  - F/u MRCP  - Surgery consult for cholecystitis/ gallbladder polyp  - Cont Zosyn  - consider liver bx for further evaluation of etiology if susp of small duct dz.    Enedelia Neville MD  Gastroenterology Fellow, PGY -5   Weekday Pager [phone]

## 2022-12-03 NOTE — PROGRESS NOTE ADULT - ASSESSMENT
63 y/o F with PMHx distal CBD stricture s/p stent placement on 6/8/2017 (on amlodipine) and HLD (on rosuvastatin) presents to ED c/o epigastric and right sided abdominal pain and admitted for abdominal pain likely 2/2 cholangitis vs biliary colic. 63 y/o F with PMHx distal CBD stricture s/p stent placement on 6/8/2017 (on amlodipine) and HLD (on rosuvastatin) presents to ED c/o epigastric and right sided abdominal pain and admitted for abdominal pain 2/2 cholecystitis

## 2022-12-03 NOTE — PRE-ANESTHESIA EVALUATION ADULT - NSANTHPMHFT_GEN_ALL_CORE
61 y/o F with PMHx distal CBD stricture s/p stent placement on 6/8/2017 (on amlodipine) and HLD (on rosuvastatin) presents to ED c/o epigastric and right sided abdominal pain and admitted for abdominal pain likely 2/2 cholangitis vs biliary colic.

## 2022-12-03 NOTE — PROGRESS NOTE ADULT - PROBLEM SELECTOR PLAN 1
Patient with RUQ, epigastric, periumbillical pain and CT findings with gallbladder slude and worsening CBD dilation. No hx of fatty meal today and patient ate a salad before the pain began. Has received 12 IV morphine in ED with mild relief. Radiating to back and chest on initial presentation, however EKG unremarkable for cardiac ischemia   New Tmax 102.5 on 12/2. Blood cultures sent.  - starting zosyn 3.375g q6h x7 days (12/2- ) for acute cholangitis treatment  - f/u blood cultures  - GI consulted, will follow recs   - surgery consulted, will follow recs  - f/u MRCP   - pain control with PRN tylenol and PRN morphine for breakthrough Patient with RUQ, epigastric, periumbillical pain and CT findings with gallbladder slude and worsening CBD dilation. No hx of fatty meal today and patient ate a salad before the pain began. Has received 12 IV morphine in ED with mild relief. Radiating to back and chest on initial presentation, however EKG unremarkable for cardiac ischemia   New Tmax 102.5 on 12/2. Blood cultures sent.  - starting zosyn 3.375g q6h x7 days (12/2- ) for acute cholangitis treatment  - pain control with PRN tylenol and PRN morphine for breakthrough  -Surgery re-evaluated and decision made for lap rajinder procedure today with transfer to surgery service

## 2022-12-03 NOTE — PROGRESS NOTE ADULT - PROBLEM SELECTOR PLAN 4
F: None   E: replenish as necessary K>4 Mg>2  N: DASH diet   DVT Prophylaxis: Lovenox 40mg q24h  GI prophylaxis: None   CODE STATUS: FULL

## 2022-12-03 NOTE — PROGRESS NOTE ADULT - ASSESSMENT
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  Patient is admitted for severe RUQ and epigastric abdominal pain.  CT imaging (11/30/2022) in comparison to 06/2017 imaging demonstrated new multiple nondependent densities within the gallbladder (ddx polyps, tumors) in the absence of pericholecystic fluid, wall edema/thickening.  There was also severe steatosis, and increased CBD dilation (1.4 >1.6cm) in the absence of the stent.       PLAN  - Plan for cholecystectomy today  - Please add on CA 19-9  - Follow-up MRCP  - Surgery Team 1C will continue to follow  - Please page Team 4 at [phone] with any questions and/or clinical changes

## 2022-12-03 NOTE — PROVIDER CONTACT NOTE (OTHER) - SITUATION
Provider made aware of patient temperature of 100.5 F and that current right IV access is intact. Zoysn to be started and rescheduled accordingly.

## 2022-12-03 NOTE — PROGRESS NOTE ADULT - SUBJECTIVE AND OBJECTIVE BOX
GASTROENTEROLOGY PROGRESS NOTE  Patient seen and examined at bedside.  Febrile yesterday w Tm 102, now improving s/p zosyn  Leukocytosis to 14.6; LTs remain wnl  Cont to c/o RUQ abd pain    PERTINENT REVIEW OF SYSTEMS:  CONSTITUTIONAL: No weakness, fevers or chills  HEENT: No visual changes; No vertigo or throat pain   GASTROINTESTINAL: As above.  NEUROLOGICAL: No numbness or weakness  SKIN: No itching, burning, rashes, or lesions     Allergies    No Known Allergies    Intolerances      MEDICATIONS:  MEDICATIONS  (STANDING):  amLODIPine   Tablet 5 milliGRAM(s) Oral at bedtime  atorvastatin 40 milliGRAM(s) Oral at bedtime  enoxaparin Injectable 40 milliGRAM(s) SubCutaneous every 24 hours  lisinopril 10 milliGRAM(s) Oral daily  piperacillin/tazobactam IVPB.. 3.375 Gram(s) IV Intermittent every 6 hours    MEDICATIONS  (PRN):  acetaminophen     Tablet .. 650 milliGRAM(s) Oral every 6 hours PRN Temp greater or equal to 38C (100.4F), Mild Pain (1 - 3), Moderate Pain (4 - 6)  morphine  - Injectable 4 milliGRAM(s) IV Push every 4 hours PRN Severe Pain (7 - 10)    Vital Signs Last 24 Hrs  T(C): 37.7 (03 Dec 2022 12:55), Max: 39.2 (02 Dec 2022 14:44)  T(F): 99.9 (03 Dec 2022 05:46), Max: 102.5 (02 Dec 2022 14:44)  HR: 89 (03 Dec 2022 12:55) (84 - 98)  BP: 148/80 (03 Dec 2022 12:55) (102/66 - 148/80)  BP(mean): 78 (03 Dec 2022 12:55) (78 - 78)  RR: 18 (03 Dec 2022 12:55) (16 - 18)  SpO2: 95% (03 Dec 2022 12:55) (93% - 97%)    Parameters below as of 03 Dec 2022 05:46  Patient On (Oxygen Delivery Method): room air        PHYSICAL EXAM:    General: in no acute distress  HEENT: MMM, conjunctiva and sclera clear  Gastrointestinal: Soft non-tender non-distended; No rebound or guarding  Skin: Warm and dry. No obvious rash    LABS:                        10.6   14.63 )-----------( 193      ( 03 Dec 2022 06:07 )             34.7     12-03    139  |  104  |  7   ----------------------------<  122<H>  3.6   |  22  |  0.76    Ca    8.5      03 Dec 2022 06:07  Phos  2.1     12-03  Mg     1.9     12-03    TPro  6.7  /  Alb  3.4  /  TBili  1.2  /  DBili  x   /  AST  23  /  ALT  23  /  AlkPhos  66  12-03                      Culture - Blood (collected 02 Dec 2022 12:21)  Source: .Blood Blood  Preliminary Report (03 Dec 2022 14:01):    No growth at 1 day.    Culture - Blood (collected 02 Dec 2022 12:21)  Source: .Blood Blood  Preliminary Report (03 Dec 2022 14:01):    No growth at 1 day.      RADIOLOGY & ADDITIONAL STUDIES:  Reviewed

## 2022-12-03 NOTE — PROGRESS NOTE ADULT - SUBJECTIVE AND OBJECTIVE BOX
OVERNIGHT EVENTS: No acute events.    SUBJECTIVE / INTERVAL HPI: Patient seen and examined at bedside. Reports significant discomfort due to RUQ/epigastric pain, rated 7/10, improved with pain regimen. Endorses fever, chills, nausea; denies vomiting. ROS otherwise negative.    VITAL SIGNS:  Vital Signs Last 24 Hrs   T(C): 37 (02 Dec 2022 05:00), Max: 38.8 (01 Dec 2022 21:16)  T(F): 98.6 (02 Dec 2022 05:00), Max: 101.8 (01 Dec 2022 21:16)  HR: 86 (02 Dec 2022 05:00) (70 - 91)  BP: 131/74 (02 Dec 2022 05:00) (131/74 - 153/83)  BP(mean): --  RR: 18 (02 Dec 2022 05:00) (16 - 18)  SpO2: 96% (02 Dec 2022 05:00) (95% - 97%)    Parameters below as of 02 Dec 2022 05:00  Patient On (Oxygen Delivery Method): room air    PHYSICAL EXAM:  Constitutional: resting in bed; discomfort due to pain  HEENT: NC/AT, PERRL, EOMI, anicteric sclera   Neck: supple; no JVD or thyromegaly  Respiratory: CTA B/L; no W/Rhonchi/Crackles, no retractions or use of accessory muscles   Cardiac: +S1/S2; no M/R/G  Gastrointestinal: +TTP in RUQ/RLQ/epigastric area, +guarding, soft, ND, no rebound/rigidity, +BSx4  Extremities: WWP, no clubbing or cyanosis; no peripheral edema  Vascular: 2+ radial pulses B/L  Dermatologic: skin warm, dry and intact; no rashes, wounds, or scars  Neurologic: AAOx3; CNII-XII grossly intact; no focal deficits    MEDICATIONS:  MEDICATIONS  (STANDING):  amLODIPine   Tablet 5 milliGRAM(s) Oral at bedtime  atorvastatin 40 milliGRAM(s) Oral at bedtime  enoxaparin Injectable 40 milliGRAM(s) SubCutaneous every 24 hours  lisinopril 10 milliGRAM(s) Oral daily  potassium phosphate IVPB 30 milliMole(s) IV Intermittent once    MEDICATIONS  (PRN):  acetaminophen     Tablet .. 650 milliGRAM(s) Oral every 6 hours PRN Moderate Pain (4 - 6)  morphine  - Injectable 4 milliGRAM(s) IV Push every 4 hours PRN Severe Pain (7 - 10)    ALLERGIES:  No Known Allergies    LABS:             11.8   13.76 )-----------( 221      ( 02 Dec 2022 08:39 )             38.0     12-02    134<L>  |  98  |  10  ----------------------------<  131<H>  3.6   |  25  |  0.88    Ca    9.3      02 Dec 2022 08:39  Phos  2.1     12-  Mg     2.1     12-    TPro  7.5  /  Alb  4.2  /  TBili  1.3<H>  /  DBili  x   /  AST  16  /  ALT  26  /  AlkPhos  68  12-02    PT/INR - ( 2022 17:25 )   PT: 12.4 sec;   INR: 1.04     PTT - ( 2022 17:25 )  PTT:29.1 sec  Urinalysis Basic - ( 2022 17:00 )    Color: Yellow / Appearance: Cloudy / S.015 / pH: x  Gluc: x / Ketone: NEGATIVE  / Bili: Negative / Urobili: 0.2 E.U./dL   Blood: x / Protein: NEGATIVE mg/dL / Nitrite: NEGATIVE   Leuk Esterase: NEGATIVE / RBC: x / WBC x   Sq Epi: x / Non Sq Epi: x / Bacteria: x      CAPILLARY BLOOD GLUCOSE          RADIOLOGY & ADDITIONAL TESTS: Reviewed.   [facility]     OVERNIGHT EVENTS: No acute events.    SUBJECTIVE / INTERVAL HPI: Patient seen and examined at bedside. Reports significant discomfort due to RUQ/epigastric pain, rated 7/10, improved with pain regimen. Endorses fever, chills, nausea; denies vomiting. ROS otherwise negative.    VITAL SIGNS:  Vital Signs Last 24 Hrs   T(C): 37 (02 Dec 2022 05:00), Max: 38.8 (01 Dec 2022 21:16)  T(F): 98.6 (02 Dec 2022 05:00), Max: 101.8 (01 Dec 2022 21:16)  HR: 86 (02 Dec 2022 05:00) (70 - 91)  BP: 131/74 (02 Dec 2022 05:00) (131/74 - 153/83)  BP(mean): --  RR: 18 (02 Dec 2022 05:00) (16 - 18)  SpO2: 96% (02 Dec 2022 05:00) (95% - 97%)    Parameters below as of 02 Dec 2022 05:00  Patient On (Oxygen Delivery Method): room air    PHYSICAL EXAM:  Constitutional: resting in bed; discomfort due to pain  HEENT: NC/AT, PERRL, EOMI, anicteric sclera   Neck: supple; no JVD or thyromegaly  Respiratory: CTA B/L; no W/Rhonchi/Crackles, no retractions or use of accessory muscles   Cardiac: +S1/S2; no M/R/G  Gastrointestinal: +TTP in RUQ/RLQ/epigastric area, +guarding, soft, ND, no rebound/rigidity, +BSx4  Extremities: WWP, no clubbing or cyanosis; no peripheral edema  Vascular: 2+ radial pulses B/L  Dermatologic: skin warm, dry and intact; no rashes, wounds, or scars  Neurologic: AAOx3; CNII-XII grossly intact; no focal deficits    MEDICATIONS:  MEDICATIONS  (STANDING):  amLODIPine   Tablet 5 milliGRAM(s) Oral at bedtime  atorvastatin 40 milliGRAM(s) Oral at bedtime  enoxaparin Injectable 40 milliGRAM(s) SubCutaneous every 24 hours  lisinopril 10 milliGRAM(s) Oral daily  potassium phosphate IVPB 30 milliMole(s) IV Intermittent once    MEDICATIONS  (PRN):  acetaminophen     Tablet .. 650 milliGRAM(s) Oral every 6 hours PRN Moderate Pain (4 - 6)  morphine  - Injectable 4 milliGRAM(s) IV Push every 4 hours PRN Severe Pain (7 - 10)    ALLERGIES:  No Known Allergies    LABS:             11.8   13.76 )-----------( 221      ( 02 Dec 2022 08:39 )             38.0     12-02    134<L>  |  98  |  10  ----------------------------<  131<H>  3.6   |  25  |  0.88    Ca    9.3      02 Dec 2022 08:39  Phos  2.1     12-02  Mg     2.1     12-02    TPro  7.5  /  Alb  4.2  /  TBili  1.3<H>  /  DBili  x   /  AST  16  /  ALT  26  /  AlkPhos  68  12-02    PT/INR - ( 2022 17:25 )   PT: 12.4 sec;   INR: 1.04     PTT - ( 2022 17:25 )  PTT:29.1 sec  Urinalysis Basic - ( 2022 17:00 )    Color: Yellow / Appearance: Cloudy / S.015 / pH: x  Gluc: x / Ketone: NEGATIVE  / Bili: Negative / Urobili: 0.2 E.U./dL   Blood: x / Protein: NEGATIVE mg/dL / Nitrite: NEGATIVE   Leuk Esterase: NEGATIVE / RBC: x / WBC x   Sq Epi: x / Non Sq Epi: x / Bacteria: x      CAPILLARY BLOOD GLUCOSE          RADIOLOGY & ADDITIONAL TESTS: Reviewed.

## 2022-12-03 NOTE — PROGRESS NOTE ADULT - ATTENDING COMMENTS
Patient seen and examined with Dr. Neville.  Patient with fever overnight and mild elevation in bilirubin. Pending MRI/MRCP. Surgery evaluation given abnormal CT finding.
Acute rajinder, with some duct dilation without obvious stone in duct.   pending MRCP and surgery following.   ? distal stricture due to hx of stents?
62F w obesity, HTN, HLD, recent CBD stent p/w RUQ pain, N/V - found to have dilated CBD and gallbladder sludge - admitted for pain control, pending MRCP    Pt reports worsening diffuse abd pain several days ago after eating timothy lettuce and 1d old refrigerated rotisserie chicken. Otherwise no sick contacts. Pain is in epigastrium, intermittent, radiating to back and RUQ. Accompanied w nausea, emesis (none since yesterday) - unclear if bloody or bilious. Had some BMs but otherwise none since losing appetite. Reports chills, sweats but no fevers. No dysuria. Took some tylenol but otherwise no change in medications  Exam: Female in NAD on RA, MMM, RRR, nml resp effort, abd soft, NABS, tender mostly in epigastrium wo rebound, guarding. Abd is soft, hypoactive BS. A/Ox3, moving all ext    #Abd pain w nausea - found to have dilated CBD s/p stent. Etiology unclear. Does not appear acutely toxic. Nml LFTs, TBil, lipase.   #Steatosis - noted on CT A/P  #HTN – c/w amlo 5, lisinopril 10  #HLD – c/w atorva 40  #Obesity - 38 affects all aspects of care    Plan  Overall continue to have abd pain - though managed w pain medications. Slight nausea but no further emesis. Transitioned off IV toradol; on prn tylenol and morphine.  MRCP  Serial abd exams  f/u GI recs  On DASH diet - encourage fluid intake    DISPO: TBD
62F with PMH HTN, HLD, obesity and recent CBD stent presenting with abdominal pain, nausea and vomiting.  Now with rising leukocytosis, bilirubin and fevers.  Pending further workup including surgery eval and MRCP.     Physical exam:  General: uncomfortable, and in pain, lying on her back  HEENT: normocephalic, atraumatic, MMM  Cards: RRR, normal S1/S2, no murmurs, rubs or gallops  Pulm: CTAB, no wheeze, crackles, rales or rhonchi  Ab: significant RUQ tenderness to palpation, additionally has some generalized tenderness to palpation of abdomen, normoactive bowel sounds, no distention  Neuro: grossly nonfocal exam, moves all extremities, speech is fluent  Ext: wwp, no edema, erythema or tenderness  Skin: warm and dry    - started having fevers this morning, and rising leukocytosis and bilirubin  - trying to expedite MRCP to evaluate for further pathology  - starting on Zosyn  - ?concern for developing cholangitis  - fluid resuscitation, check blood cultures, lactate, inflammatory markers  - appreciate input from GI and general surgery  - continue to monitor abdominal exam  - continue with pain control

## 2022-12-03 NOTE — CHART NOTE - NSCHARTNOTEFT_GEN_A_CORE
Patient is preop for cholecystectomy and reminded surgical team that she is a Christian and would refuse any blood products. Family member bedside during conversation. When asked if she would accept any blood products including packed red blood cells, plasma, or platelets even if refusing would result in death, she confirms that she would not accept these products in the event that they were indicated. She initialled this on her surgical consent as well. Surgical team included attending surgeon, resident, nurse aware.
POST-OPERATIVE NOTE    Subjective:  Patient is s/p lap cholecystectomy. Recovering appropriately. Denies N/V/SOB/CP. She reports continued abdominal pain at surgical site. She rates her pain 8/10.     Vital Signs Last 24 Hrs  T(C): 37.7 (03 Dec 2022 12:55), Max: 38.2 (03 Dec 2022 12:12)  T(F): 100.7 (03 Dec 2022 12:12), Max: 100.7 (03 Dec 2022 12:12)  HR: 77 (03 Dec 2022 18:11) (73 - 98)  BP: 148/70 (03 Dec 2022 18:11) (109/65 - 169/72)  BP(mean): 101 (03 Dec 2022 18:11) (78 - 103)  RR: 16 (03 Dec 2022 18:11) (15 - 20)  SpO2: 99% (03 Dec 2022 18:11) (94% - 99%)    Parameters below as of 03 Dec 2022 17:56  Patient On (Oxygen Delivery Method): nasal cannula  O2 Flow (L/min): 2    I&O's Detail    03 Dec 2022 07:01  -  03 Dec 2022 19:23  --------------------------------------------------------  IN:  Total IN: 0 mL    OUT:    Bulb (mL): 25 mL  Total OUT: 25 mL    Total NET: -25 mL        Physical Exam:  General: NAD, resting comfortably in bed  Pulmonary: Nonlabored breathing, no respiratory distress  Cardiovascular: NSR  Abdominal: soft, minimally distended, moderate tenderness to palpation in RUQ, incisions c/d/i, KEYANA with SS output  Extremities: WWP    LABS:                        12.1   15.03 )-----------( 194      ( 03 Dec 2022 18:04 )             39.5     12-03    138  |  103  |  7   ----------------------------<  150<H>  3.8   |  20<L>  |  0.72    Ca    9.2      03 Dec 2022 18:04  Phos  2.6     12-03  Mg     2.2     12-03    TPro  7.4  /  Alb  3.4  /  TBili  0.8  /  DBili  x   /  AST  203<H>  /  ALT  122<H>  /  AlkPhos  79  12-03      CAPILLARY BLOOD GLUCOSE      POCT Blood Glucose.: 115 mg/dL (03 Dec 2022 12:01)  POCT Blood Glucose.: 108 mg/dL (03 Dec 2022 08:40)      Radiology and Additional Studies:    Assessment:  The patient is a 62y Female who is now several hours post-op from a laparoscopic cholecystectomy.     Plan:  - Pain control as needed  - holding DVT prophylaxis 12/3  - one dose of zosyn post op  - OOB and ambulating as tolerated  - F/u AM labs  - CLD/IVF

## 2022-12-03 NOTE — PROGRESS NOTE ADULT - SUBJECTIVE AND OBJECTIVE BOX
SUBJECTIVE: Patient seen and examined at bedside pre-operatively. She continues to report RUQ pain. She reports fever. Denies N/V.     amLODIPine   Tablet 5 milliGRAM(s) Oral at bedtime  enoxaparin Injectable 40 milliGRAM(s) SubCutaneous every 24 hours  lisinopril 10 milliGRAM(s) Oral daily  piperacillin/tazobactam IVPB.. 3.375 Gram(s) IV Intermittent every 6 hours    MEDICATIONS  (PRN):  acetaminophen     Tablet .. 650 milliGRAM(s) Oral every 6 hours PRN Temp greater or equal to 38C (100.4F), Mild Pain (1 - 3), Moderate Pain (4 - 6)  morphine  - Injectable 4 milliGRAM(s) IV Push every 4 hours PRN Severe Pain (7 - 10)      I&O's Detail      T(C): 37.7 (12-03-22 @ 12:55), Max: 38.1 (12-03-22 @ 02:00)  HR: 89 (12-03-22 @ 12:55) (84 - 98)  BP: 148/80 (12-03-22 @ 12:55) (109/65 - 148/80)  RR: 18 (12-03-22 @ 12:55) (17 - 18)  SpO2: 95% (12-03-22 @ 12:55) (94% - 97%)    GENERAL: NAD, Resting comfortably in bed, awake, opens eyes spontaneously  HEENT: NCAT  RESP: Nonlabored breathing, No respiratory distress  CARD: Normal rate, Normal peripheral perfusion  GI: Soft, moderately distended, tenderness to palpation in the RUQ  EXTREM: WWP  NEURO: AAOx3, No focal motor or sensory deficits  PSYCH: Affect and characteristics of appearance, verbalizations, and behaviors are appropriate    LABS:                        10.6   14.63 )-----------( 193      ( 03 Dec 2022 06:07 )             34.7     12-03    139  |  104  |  7   ----------------------------<  122<H>  3.6   |  22  |  0.76    Ca    8.5      03 Dec 2022 06:07  Phos  2.1     12-03  Mg     1.9     12-03    TPro  6.7  /  Alb  3.4  /  TBili  1.2  /  DBili  x   /  AST  23  /  ALT  23  /  AlkPhos  66  12-03          RADIOLOGY & ADDITIONAL STUDIES:  CT Abdomen and Pelvis w/ IV Cont:   ACC: [id number] EXAM:  CT ABDOMEN AND PELVIS IC                          PROCEDURE DATE:  11/30/2022          INTERPRETATION:  CLINICAL INFORMATION: Right upper quadrant pain, post   CBD stent placement for stricture.    COMPARISON: CT abdomen pelvisJune 12, 2017 and MRI abdomen December 11, 2015    CONTRAST/COMPLICATIONS:  IV Contrast: Isovue 370  95 cc administered   5 cc discarded  Oral Contrast: NONE  Complications: None reported at time of study completion    PROCEDURE:  CT of the Abdomenand Pelvis was performed.  Sagittal and coronal reformats were performed.    FINDINGS:  LOWER CHEST: Within normal limits.    HEPATOBILIARY: Severe steatosis areas of fatty sparing. No suspicious   liver lesion. No intrahepatic biliary dilatation. Previously described   common bile duct stent removed. Increased common bile duct dilatation,   similar to baseline MRI in 2015, up to 1.6 cm, previously 1.4 cm.   Gallbladder multifocal nondependent densities, involving neck (4, 96),   body and fundus (4, 96; 4, 100), polyps or dallas affective adherent sludge.   New prominent lymph node in gallbladder fossa (3, 48), 1.1 cm. No   pericholecystic fluid or wall edema/thickening right  SPLEEN: Within normal limits.  PANCREAS: Within normal limits.  ADRENALS: Within normal limits.  KIDNEYS/URETERS: Few cortical cysts.    BLADDER: Within normal limits.  REPRODUCTIVE ORGANS: Leiomyomatous uterus    BOWEL: No bowel obstruction.  PERITONEUM: No ascites.    RETROPERITONEUM/LYMPH NODES: Gallbladder fossa/gastrohepatic ligament   lymph node as above, 1.1 cm.    BONES AND SOFT TISSUES: Suspicious lesion. Diastasis recti.    IMPRESSION:    1. Since June 12, 2017, new multiple nondependent densities in   gallbladder, polyps or dallas affective adherent sludge.New prominent   gastrohepatic ligament lymph node. Recommend MRI without and with venous   contrast for further evaluation.  No acute cholecystitis.  2. No intrahepatic biliary dilatation. Increased common bile duct   dilatation, post stent removal.  3. No suspicious liver lesion. Severe hepatic steatosis.        --- End of Report ---          LUCI HUSAIN MD; Attending Radiologist  This document has been electronically signed.  LUCI HUSAIN MD; Attending Radiologist  This document has been electronically signed. Nov 30 2022  7:52PM (11-30-22 @ 19:24)      Culture - Blood (collected 12-02-22 @ 12:21)  Source: .Blood Blood  Preliminary Report (12-03-22 @ 14:01):    No growth at 1 day.    Culture - Blood (collected 12-02-22 @ 12:21)  Source: .Blood Blood  Preliminary Report (12-03-22 @ 14:01):    No growth at 1 day.

## 2022-12-04 ENCOUNTER — TRANSCRIPTION ENCOUNTER (OUTPATIENT)
Age: 62
End: 2022-12-04

## 2022-12-04 LAB
ANION GAP SERPL CALC-SCNC: 10 MMOL/L — SIGNIFICANT CHANGE UP (ref 5–17)
BUN SERPL-MCNC: 8 MG/DL — SIGNIFICANT CHANGE UP (ref 7–23)
CALCIUM SERPL-MCNC: 9.2 MG/DL — SIGNIFICANT CHANGE UP (ref 8.4–10.5)
CHLORIDE SERPL-SCNC: 105 MMOL/L — SIGNIFICANT CHANGE UP (ref 96–108)
CO2 SERPL-SCNC: 26 MMOL/L — SIGNIFICANT CHANGE UP (ref 22–31)
CREAT SERPL-MCNC: 0.81 MG/DL — SIGNIFICANT CHANGE UP (ref 0.5–1.3)
EGFR: 82 ML/MIN/1.73M2 — SIGNIFICANT CHANGE UP
GLUCOSE SERPL-MCNC: 130 MG/DL — HIGH (ref 70–99)
HCT VFR BLD CALC: 31.6 % — LOW (ref 34.5–45)
HGB BLD-MCNC: 10 G/DL — LOW (ref 11.5–15.5)
MAGNESIUM SERPL-MCNC: 2.2 MG/DL — SIGNIFICANT CHANGE UP (ref 1.6–2.6)
MCHC RBC-ENTMCNC: 27.6 PG — SIGNIFICANT CHANGE UP (ref 27–34)
MCHC RBC-ENTMCNC: 31.6 GM/DL — LOW (ref 32–36)
MCV RBC AUTO: 87.3 FL — SIGNIFICANT CHANGE UP (ref 80–100)
NRBC # BLD: 0 /100 WBCS — SIGNIFICANT CHANGE UP (ref 0–0)
PHOSPHATE SERPL-MCNC: 3 MG/DL — SIGNIFICANT CHANGE UP (ref 2.5–4.5)
PLATELET # BLD AUTO: 210 K/UL — SIGNIFICANT CHANGE UP (ref 150–400)
POTASSIUM SERPL-MCNC: 4 MMOL/L — SIGNIFICANT CHANGE UP (ref 3.5–5.3)
POTASSIUM SERPL-SCNC: 4 MMOL/L — SIGNIFICANT CHANGE UP (ref 3.5–5.3)
RBC # BLD: 3.62 M/UL — LOW (ref 3.8–5.2)
RBC # FLD: 15.3 % — HIGH (ref 10.3–14.5)
SODIUM SERPL-SCNC: 141 MMOL/L — SIGNIFICANT CHANGE UP (ref 135–145)
WBC # BLD: 11.92 K/UL — HIGH (ref 3.8–10.5)
WBC # FLD AUTO: 11.92 K/UL — HIGH (ref 3.8–10.5)

## 2022-12-04 RX ORDER — HEPARIN SODIUM 5000 [USP'U]/ML
7500 INJECTION INTRAVENOUS; SUBCUTANEOUS EVERY 8 HOURS
Refills: 0 | Status: DISCONTINUED | OUTPATIENT
Start: 2022-12-04 | End: 2022-12-05

## 2022-12-04 RX ORDER — HEPARIN SODIUM 5000 [USP'U]/ML
5000 INJECTION INTRAVENOUS; SUBCUTANEOUS EVERY 8 HOURS
Refills: 0 | Status: DISCONTINUED | OUTPATIENT
Start: 2022-12-04 | End: 2022-12-04

## 2022-12-04 RX ADMIN — OXYCODONE HYDROCHLORIDE 10 MILLIGRAM(S): 5 TABLET ORAL at 21:40

## 2022-12-04 RX ADMIN — OXYCODONE HYDROCHLORIDE 5 MILLIGRAM(S): 5 TABLET ORAL at 09:13

## 2022-12-04 RX ADMIN — OXYCODONE HYDROCHLORIDE 10 MILLIGRAM(S): 5 TABLET ORAL at 14:03

## 2022-12-04 RX ADMIN — OXYCODONE HYDROCHLORIDE 10 MILLIGRAM(S): 5 TABLET ORAL at 22:15

## 2022-12-04 RX ADMIN — Medication 400 MILLIGRAM(S): at 00:47

## 2022-12-04 RX ADMIN — Medication 650 MILLIGRAM(S): at 14:03

## 2022-12-04 RX ADMIN — Medication 650 MILLIGRAM(S): at 06:40

## 2022-12-04 RX ADMIN — Medication 650 MILLIGRAM(S): at 05:48

## 2022-12-04 RX ADMIN — HEPARIN SODIUM 7500 UNIT(S): 5000 INJECTION INTRAVENOUS; SUBCUTANEOUS at 14:03

## 2022-12-04 RX ADMIN — ATORVASTATIN CALCIUM 40 MILLIGRAM(S): 80 TABLET, FILM COATED ORAL at 21:33

## 2022-12-04 RX ADMIN — OXYCODONE HYDROCHLORIDE 5 MILLIGRAM(S): 5 TABLET ORAL at 08:44

## 2022-12-04 RX ADMIN — AMLODIPINE BESYLATE 5 MILLIGRAM(S): 2.5 TABLET ORAL at 21:33

## 2022-12-04 RX ADMIN — Medication 650 MILLIGRAM(S): at 15:00

## 2022-12-04 RX ADMIN — Medication 650 MILLIGRAM(S): at 19:27

## 2022-12-04 RX ADMIN — HEPARIN SODIUM 7500 UNIT(S): 5000 INJECTION INTRAVENOUS; SUBCUTANEOUS at 21:33

## 2022-12-04 RX ADMIN — Medication 1000 MILLIGRAM(S): at 01:15

## 2022-12-04 RX ADMIN — OXYCODONE HYDROCHLORIDE 10 MILLIGRAM(S): 5 TABLET ORAL at 15:00

## 2022-12-04 NOTE — DISCHARGE NOTE PROVIDER - EXTENDED VTE YES NO FOR MLM ENOXAPARIN
For the next 12 hours you should not:   1. drive   2. drink alcohol   3. operate any machinery   4. engage in activities that require mental sharpness or manual dexterity such as     cooking   5. take any drugs other than those prescribed by a physician   6. make any legal or financial decisions  Call your doctor's office immediately, if:   1. increased and continuing rectal bleeding   2. fever   3. increased abdominal pain    Take it easy today and resume normal activity tomorrow. Resume previous diet. Patient armband removed and given to patient to take home.   Patient was informed of the privacy risks if armband lost or stolen
,

## 2022-12-04 NOTE — DISCHARGE NOTE PROVIDER - CARE PROVIDERS DIRECT ADDRESSES
,[email or web address]

## 2022-12-04 NOTE — DISCHARGE NOTE PROVIDER - INSTRUCTIONS
Please continue a low fat diet on discharge. This diet consists of lean meats, beans, grains, fruits, vegetables and products considered low in fat, such as low-fat dairy. Aviod foods containing lots of butter or oil, fried foods and processed meats.     Please continue a low fat diet on discharge. This diet consists of lean meats, beans, grains, fruits, vegetables and products considered low in fat, such as low-fat dairy. Avoid foods containing lots of butter or oil, fried foods and processed meats.

## 2022-12-04 NOTE — DISCHARGE NOTE PROVIDER - NSDCFUADDINST_GEN_ALL_CORE_FT
Follow up with Dr. Sanabria in 1-2 weeks. Call the office at the number below to schedule your appointment. You may shower; soap and water over incision sites. Do not scrub. Pat dry when done. No tub bathing or swimming until cleared. Keep incision sites out of the sun as scars will darken. Ambulate as tolerated, but no heavy lifting (>10lbs) or strenuous exercise. You may resume regular diet. You should be urinating at least 3-4x per day. Call the office if you experience increasing abdominal pain, nausea, vomiting, or temperature >101 F.   Follow up with Dr. Sanabria in 1-2 weeks. Call the office at the number below to schedule your appointment. You may shower; soap and water over incision sites. Do not scrub. Pat dry when done. No tub bathing or swimming until cleared. Keep incision sites out of the sun as scars will darken. Ambulate as tolerated, but no heavy lifting (>10lbs) or strenuous exercise. You may resume regular diet. You should be urinating at least 3-4x per day. Call the office if you experience increasing abdominal pain, nausea, vomiting, or temperature >101 F.    Pain control:  Please take 2 tabs of extra strength Tylenol every 6 hours as needed for pain. DO NOT exceed 4000 mg per day. You have been prescribed oxycodone for pain not controlled by Tylenol. Take 1 tab every 6 hours as needed for severe pain. Please do not exceed 4 tabs per day. Take prescribed stool softener (colace) to prevent constipation caused by oxycodone.

## 2022-12-04 NOTE — DISCHARGE NOTE PROVIDER - CARE PROVIDER_API CALL
Bony Sanabria)  Surgery  [address] [address]  Phone: [phone]  Fax: [phone]  Follow Up Time:

## 2022-12-04 NOTE — DISCHARGE NOTE PROVIDER - NSDCCPCAREPLAN_GEN_ALL_CORE_FT
PRINCIPAL DISCHARGE DIAGNOSIS  Diagnosis: Dilated cbd, acquired  Assessment and Plan of Treatment:        PRINCIPAL DISCHARGE DIAGNOSIS  Diagnosis: Dilated cbd, acquired  Assessment and Plan of Treatment: Follow up with Dr. Sanabria in 1-2 weeks. Call the office at the number below to schedule your appointment. You may shower; soap and water over incision sites. Do not scrub. Pat dry when done. No tub bathing or swimming until cleared. Keep incision sites out of the sun as scars will darken. Ambulate as tolerated, but no heavy lifting (>10lbs) or strenuous exercise. You may resume regular diet. You should be urinating at least 3-4x per day. Call the office if you experience increasing abdominal pain, nausea, vomiting, or temperature >101 F.

## 2022-12-04 NOTE — PROGRESS NOTE ADULT - SUBJECTIVE AND OBJECTIVE BOX
Overnight POC wnl, desat on RA due to poor inspiratory effort     SUBJECTIVE:  Patient is doing well this morning, seen and examined at bedside, denies any new complaints. Denies any nausea, vomiting, chest pain, shortness of breath, calf tenderness, fever or chills.     MEDICATIONS  (STANDING):  acetaminophen     Tablet .. 650 milliGRAM(s) Oral every 6 hours  amLODIPine   Tablet 5 milliGRAM(s) Oral at bedtime  atorvastatin 40 milliGRAM(s) Oral at bedtime  lactated ringers. 1000 milliLiter(s) (85 mL/Hr) IV Continuous <Continuous>  lisinopril 10 milliGRAM(s) Oral daily    MEDICATIONS  (PRN):  ondansetron    Tablet 4 milliGRAM(s) Oral every 6 hours PRN Nausea and/or Vomiting  oxyCODONE    IR 5 milliGRAM(s) Oral every 4 hours PRN Moderate Pain (4 - 6)  oxyCODONE    IR 10 milliGRAM(s) Oral every 4 hours PRN Severe Pain (7 - 10)      Vital Signs Last 24 Hrs  T(C): 36.7 (04 Dec 2022 05:11), Max: 38.2 (03 Dec 2022 12:12)  T(F): 98.1 (04 Dec 2022 05:11), Max: 100.7 (03 Dec 2022 12:12)  HR: 70 (04 Dec 2022 05:11) (70 - 92)  BP: 104/55 (04 Dec 2022 05:11) (104/55 - 169/72)  BP(mean): 93 (03 Dec 2022 19:11) (78 - 103)  RR: 17 (04 Dec 2022 05:11) (14 - 20)  SpO2: 97% (04 Dec 2022 05:11) (93% - 99%)    Parameters below as of 04 Dec 2022 05:11  Patient On (Oxygen Delivery Method): room air        PHYSICAL EXAM:  Constitutional: AAOx3, no acute distress  HEENT: NCAT, airway patent  Cardiovascular: RRR, pulses present bilaterally  Respiratory: nonlabored breathing  Gastrointestinal: abdomen soft, nontender, non distended, no rebound or guarding, incisions are c/d/i, KEYANA drain in place with minimal SS op  Neuro: no focal deficits    I&O's Detail    03 Dec 2022 07:01  -  04 Dec 2022 07:00  --------------------------------------------------------  IN:    IV PiggyBack: 100 mL    Lactated Ringers: 977.5 mL  Total IN: 1077.5 mL    OUT:    Bulb (mL): 50 mL    Voided (mL): 500 mL  Total OUT: 550 mL    Total NET: 527.5 mL      04 Dec 2022 07:01  -  04 Dec 2022 09:04  --------------------------------------------------------  IN:  Total IN: 0 mL    OUT:    Voided (mL): 500 mL  Total OUT: 500 mL    Total NET: -500 mL          LABS:                        10.0   11.92 )-----------( 210      ( 04 Dec 2022 05:30 )             31.6     12-04    141  |  105  |  8   ----------------------------<  130<H>  4.0   |  26  |  0.81    Ca    9.2      04 Dec 2022 05:30  Phos  3.0     12-04  Mg     2.2     12-04    TPro  7.4  /  Alb  3.4  /  TBili  0.8  /  DBili  x   /  AST  203<H>  /  ALT  122<H>  /  AlkPhos  79  12-03          RADIOLOGY & ADDITIONAL STUDIES:

## 2022-12-04 NOTE — DISCHARGE NOTE PROVIDER - HOSPITAL COURSE
Ms. Rojas is a 61 yo female with a pmh of HTN and HLD who presented to the ED with epigastric and right sided abdominal pain. She was found to have gallbladder sludge and worsening CBD dilation. She previously had a distal CBD stricture with a stent placed on 6/8/2017. An attempt was made to manage the patient conservatively, however she spiked a fever and surgical intervention was indicated. She had a laparoscopic cholecystectomy done on 12/3, the procedure was uncomplicated and she tolerated it well. She is now tolerating a low fat diet, she is ambulatory and without any post operative complications. She is hemodynamically stable, her vital signs are stable and she is ready to be discharged.     Dispo: home Ms. Rojas is a 63 yo female with a pmh of HTN and HLD who presented to the ED with epigastric and right sided abdominal pain. She was found to have gallbladder sludge and worsening CBD dilation. She previously had a distal CBD stricture with a stent placed on 6/8/2017. An attempt was made to manage the patient conservatively, however she spiked a fever and surgical intervention was indicated. She had a laparoscopic cholecystectomy done on 12/3, the procedure was uncomplicated and she tolerated it well. Patient's postoperative course was uncomplicated. Diet was advanced as tolerated and pain was well controlled on medication. She is hemodynamically stable, her vital signs are stable and she is ready to be discharged.      Ms. Rojas is a 61 yo female with a pmh of HTN and HLD who presented to the ED with epigastric and right sided abdominal pain. Initially placed on antibiotics but pain worsened and imaging c/w cholecystitis. She had a laparoscopic cholecystectomy done on 12/3, the procedure was uncomplicated and she tolerated it well. Patient's postoperative course was uncomplicated. Diet was advanced as tolerated and pain was well controlled on medication. She is hemodynamically stable, her vital signs are stable and she is ready to be discharged.

## 2022-12-04 NOTE — PROGRESS NOTE ADULT - ASSESSMENT
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  Patient is admitted for severe RUQ and epigastric abdominal pain.  CT imaging (11/30/2022) in comparison to 06/2017 imaging demonstrated new multiple nondependent densities within the gallbladder (ddx polyps, tumors) in the absence of pericholecystic fluid, wall edema/thickening.  There was also severe steatosis, and increased CBD dilation (1.4 >1.6cm) in the absence of the stent.     - Follow-up MRCP  - Advance to low fat diet  - encourage ambulation  - IS   - restart home meds  - assess d/c later today

## 2022-12-04 NOTE — DISCHARGE NOTE PROVIDER - NSDCACTIVITY_GEN_ALL_CORE
Follow Instructions Provided by your Surgical Team Showering allowed/Stairs allowed/Walking - Indoors allowed/No heavy lifting/straining/Walking - Outdoors allowed/Follow Instructions Provided by your Surgical Team

## 2022-12-04 NOTE — DISCHARGE NOTE PROVIDER - NSDCMRMEDTOKEN_GEN_ALL_CORE_FT
AMLODIPINE BESY-BENAZEPRIL HCL 5-10MG CAPSULE: &#x27;TAKE 1 CAPSULE DAILY  ROSUVASTATIN CALCIUM 10MG TABLET: 1 TABLET ONCE A DAY   AMLODIPINE BESY-BENAZEPRIL HCL 5-10MG CAPSULE: &#x27;TAKE 1 CAPSULE DAILY  Colace 100 mg oral capsule: 1 cap(s) orally every 12 hours, As Needed -for constipation   oxyCODONE 5 mg oral tablet: 1 tab(s) orally every 6 hours, As Needed -for severe pain MDD:4   ROSUVASTATIN CALCIUM 10MG TABLET: 1 TABLET ONCE A DAY

## 2022-12-05 ENCOUNTER — TRANSCRIPTION ENCOUNTER (OUTPATIENT)
Age: 62
End: 2022-12-05

## 2022-12-05 VITALS
DIASTOLIC BLOOD PRESSURE: 53 MMHG | SYSTOLIC BLOOD PRESSURE: 110 MMHG | RESPIRATION RATE: 18 BRPM | TEMPERATURE: 98 F | OXYGEN SATURATION: 99 % | HEART RATE: 79 BPM

## 2022-12-05 DIAGNOSIS — K81.9 CHOLECYSTITIS, UNSPECIFIED: ICD-10-CM

## 2022-12-05 LAB
ANION GAP SERPL CALC-SCNC: 9 MMOL/L — SIGNIFICANT CHANGE UP (ref 5–17)
BUN SERPL-MCNC: 11 MG/DL — SIGNIFICANT CHANGE UP (ref 7–23)
CALCIUM SERPL-MCNC: 8.8 MG/DL — SIGNIFICANT CHANGE UP (ref 8.4–10.5)
CHLORIDE SERPL-SCNC: 102 MMOL/L — SIGNIFICANT CHANGE UP (ref 96–108)
CO2 SERPL-SCNC: 29 MMOL/L — SIGNIFICANT CHANGE UP (ref 22–31)
CREAT SERPL-MCNC: 0.8 MG/DL — SIGNIFICANT CHANGE UP (ref 0.5–1.3)
EGFR: 83 ML/MIN/1.73M2 — SIGNIFICANT CHANGE UP
GLUCOSE SERPL-MCNC: 113 MG/DL — HIGH (ref 70–99)
HCT VFR BLD CALC: 30.6 % — LOW (ref 34.5–45)
HGB BLD-MCNC: 9.6 G/DL — LOW (ref 11.5–15.5)
MAGNESIUM SERPL-MCNC: 2.2 MG/DL — SIGNIFICANT CHANGE UP (ref 1.6–2.6)
MCHC RBC-ENTMCNC: 27.5 PG — SIGNIFICANT CHANGE UP (ref 27–34)
MCHC RBC-ENTMCNC: 31.4 GM/DL — LOW (ref 32–36)
MCV RBC AUTO: 87.7 FL — SIGNIFICANT CHANGE UP (ref 80–100)
NRBC # BLD: 0 /100 WBCS — SIGNIFICANT CHANGE UP (ref 0–0)
PHOSPHATE SERPL-MCNC: 2.6 MG/DL — SIGNIFICANT CHANGE UP (ref 2.5–4.5)
PLATELET # BLD AUTO: 214 K/UL — SIGNIFICANT CHANGE UP (ref 150–400)
POTASSIUM SERPL-MCNC: 3.6 MMOL/L — SIGNIFICANT CHANGE UP (ref 3.5–5.3)
POTASSIUM SERPL-SCNC: 3.6 MMOL/L — SIGNIFICANT CHANGE UP (ref 3.5–5.3)
RBC # BLD: 3.49 M/UL — LOW (ref 3.8–5.2)
RBC # FLD: 15.3 % — HIGH (ref 10.3–14.5)
SODIUM SERPL-SCNC: 140 MMOL/L — SIGNIFICANT CHANGE UP (ref 135–145)
WBC # BLD: 8.45 K/UL — SIGNIFICANT CHANGE UP (ref 3.8–10.5)
WBC # FLD AUTO: 8.45 K/UL — SIGNIFICANT CHANGE UP (ref 3.8–10.5)

## 2022-12-05 PROCEDURE — 96376 TX/PRO/DX INJ SAME DRUG ADON: CPT

## 2022-12-05 PROCEDURE — 76705 ECHO EXAM OF ABDOMEN: CPT

## 2022-12-05 PROCEDURE — 83605 ASSAY OF LACTIC ACID: CPT

## 2022-12-05 PROCEDURE — 85025 COMPLETE CBC W/AUTO DIFF WBC: CPT

## 2022-12-05 PROCEDURE — 85730 THROMBOPLASTIN TIME PARTIAL: CPT

## 2022-12-05 PROCEDURE — C9399: CPT

## 2022-12-05 PROCEDURE — 36415 COLL VENOUS BLD VENIPUNCTURE: CPT

## 2022-12-05 PROCEDURE — 96374 THER/PROPH/DIAG INJ IV PUSH: CPT

## 2022-12-05 PROCEDURE — 71045 X-RAY EXAM CHEST 1 VIEW: CPT

## 2022-12-05 PROCEDURE — 83690 ASSAY OF LIPASE: CPT

## 2022-12-05 PROCEDURE — 85610 PROTHROMBIN TIME: CPT

## 2022-12-05 PROCEDURE — 86900 BLOOD TYPING SEROLOGIC ABO: CPT

## 2022-12-05 PROCEDURE — 88304 TISSUE EXAM BY PATHOLOGIST: CPT

## 2022-12-05 PROCEDURE — 87040 BLOOD CULTURE FOR BACTERIA: CPT

## 2022-12-05 PROCEDURE — 86803 HEPATITIS C AB TEST: CPT

## 2022-12-05 PROCEDURE — 74177 CT ABD & PELVIS W/CONTRAST: CPT | Mod: MA

## 2022-12-05 PROCEDURE — 80048 BASIC METABOLIC PNL TOTAL CA: CPT

## 2022-12-05 PROCEDURE — 0225U NFCT DS DNA&RNA 21 SARSCOV2: CPT

## 2022-12-05 PROCEDURE — 87635 SARS-COV-2 COVID-19 AMP PRB: CPT

## 2022-12-05 PROCEDURE — 80053 COMPREHEN METABOLIC PANEL: CPT

## 2022-12-05 PROCEDURE — 96375 TX/PRO/DX INJ NEW DRUG ADDON: CPT

## 2022-12-05 PROCEDURE — 82962 GLUCOSE BLOOD TEST: CPT

## 2022-12-05 PROCEDURE — 86850 RBC ANTIBODY SCREEN: CPT

## 2022-12-05 PROCEDURE — 99285 EMERGENCY DEPT VISIT HI MDM: CPT

## 2022-12-05 PROCEDURE — 83735 ASSAY OF MAGNESIUM: CPT

## 2022-12-05 PROCEDURE — 74181 MRI ABDOMEN W/O CONTRAST: CPT

## 2022-12-05 PROCEDURE — 86901 BLOOD TYPING SEROLOGIC RH(D): CPT

## 2022-12-05 PROCEDURE — 85027 COMPLETE CBC AUTOMATED: CPT

## 2022-12-05 PROCEDURE — 84100 ASSAY OF PHOSPHORUS: CPT

## 2022-12-05 RX ORDER — POTASSIUM CHLORIDE 20 MEQ
20 PACKET (EA) ORAL ONCE
Refills: 0 | Status: COMPLETED | OUTPATIENT
Start: 2022-12-05 | End: 2022-12-05

## 2022-12-05 RX ORDER — SODIUM,POTASSIUM PHOSPHATES 278-250MG
2 POWDER IN PACKET (EA) ORAL ONCE
Refills: 0 | Status: COMPLETED | OUTPATIENT
Start: 2022-12-05 | End: 2022-12-05

## 2022-12-05 RX ORDER — OXYCODONE HYDROCHLORIDE 5 MG/1
1 TABLET ORAL
Qty: 8 | Refills: 0
Start: 2022-12-05

## 2022-12-05 RX ORDER — DOCUSATE SODIUM 100 MG
1 CAPSULE ORAL
Qty: 15 | Refills: 0
Start: 2022-12-05

## 2022-12-05 RX ADMIN — Medication 650 MILLIGRAM(S): at 05:40

## 2022-12-05 RX ADMIN — Medication 20 MILLIEQUIVALENT(S): at 10:27

## 2022-12-05 RX ADMIN — Medication 650 MILLIGRAM(S): at 00:22

## 2022-12-05 RX ADMIN — HEPARIN SODIUM 7500 UNIT(S): 5000 INJECTION INTRAVENOUS; SUBCUTANEOUS at 05:40

## 2022-12-05 RX ADMIN — Medication 2 PACKET(S): at 10:28

## 2022-12-05 RX ADMIN — LISINOPRIL 10 MILLIGRAM(S): 2.5 TABLET ORAL at 05:40

## 2022-12-05 NOTE — DISCHARGE NOTE NURSING/CASE MANAGEMENT/SOCIAL WORK - PATIENT PORTAL LINK FT
You can access the FollowMyHealth Patient Portal offered by [facility] by registering at the following website: [email or web address]. By joining [facility]’s FollowMyHealth portal, you will also be able to view your health information using other applications (apps) compatible with our system.

## 2022-12-05 NOTE — PROGRESS NOTE ADULT - SUBJECTIVE AND OBJECTIVE BOX
INTERVAL HPI/OVERNIGHT EVENTS: O/N: PEPE, VSS, dc'd IVF, mild labored breathing on ambulation, satting 94% RA, wanted to stay 1 more night    STATUS POST: 12/3: cholecystectomy, , 2L IVF    POST OPERATIVE DAY #: 2    SUBJECTIVE: Pt seen and examined at bedside this am by surgery team. Reports passing gas and having BMs. Tolerating diet, pain well controlled. Denies f/n/v/cp/sob.    MEDICATIONS  (STANDING):  acetaminophen     Tablet .. 650 milliGRAM(s) Oral every 6 hours  amLODIPine   Tablet 5 milliGRAM(s) Oral at bedtime  atorvastatin 40 milliGRAM(s) Oral at bedtime  heparin   Injectable 7500 Unit(s) SubCutaneous every 8 hours  lisinopril 10 milliGRAM(s) Oral daily    MEDICATIONS  (PRN):  ondansetron    Tablet 4 milliGRAM(s) Oral every 6 hours PRN Nausea and/or Vomiting  oxyCODONE    IR 5 milliGRAM(s) Oral every 4 hours PRN Moderate Pain (4 - 6)  oxyCODONE    IR 10 milliGRAM(s) Oral every 4 hours PRN Severe Pain (7 - 10)      Vital Signs Last 24 Hrs  T(C): 36.4 (05 Dec 2022 04:31), Max: 36.9 (04 Dec 2022 20:18)  T(F): 97.6 (05 Dec 2022 04:31), Max: 98.5 (05 Dec 2022 00:19)  HR: 69 (05 Dec 2022 04:31) (69 - 93)  BP: 112/57 (05 Dec 2022 04:31) (112/57 - 147/74)  BP(mean): 90 (04 Dec 2022 13:15) (90 - 105)  RR: 18 (05 Dec 2022 04:31) (18 - 19)  SpO2: 99% (05 Dec 2022 04:31) (92% - 99%)    Parameters below as of 05 Dec 2022 04:31  Patient On (Oxygen Delivery Method): nasal cannula  O2 Flow (L/min): 2      PHYSICAL EXAM:  Constitutional: A&Ox3  Respiratory: non labored breathing, no respiratory distress  Cardiovascular: NSR, RRR  Gastrointestinal: soft, mildly distended, nontender to palpation   Incision: site c/d/i, JPx1 w/ SS drainage   Genitourinary: voiding   Extremities: WWP, no calf tenderness or edema, SCDs in place                   I&O's Detail    03 Dec 2022 07:01  -  04 Dec 2022 07:00  --------------------------------------------------------  IN:    IV PiggyBack: 100 mL    Lactated Ringers: 977.5 mL  Total IN: 1077.5 mL    OUT:    Bulb (mL): 50 mL    Voided (mL): 500 mL  Total OUT: 550 mL    Total NET: 527.5 mL      04 Dec 2022 07:01  -  05 Dec 2022 06:50  --------------------------------------------------------  IN:    Lactated Ringers: 340 mL    Oral Fluid: 450 mL  Total IN: 790 mL    OUT:    Bulb (mL): 50 mL    Voided (mL): 950 mL  Total OUT: 1000 mL    Total NET: -210 mL          LABS:                        10.0   11.92 )-----------( 210      ( 04 Dec 2022 05:30 )             31.6     12-04    141  |  105  |  8   ----------------------------<  130<H>  4.0   |  26  |  0.81    Ca    9.2      04 Dec 2022 05:30  Phos  3.0     12-04  Mg     2.2     12-04    TPro  7.4  /  Alb  3.4  /  TBili  0.8  /  DBili  x   /  AST  203<H>  /  ALT  122<H>  /  AlkPhos  79  12-03          RADIOLOGY & ADDITIONAL STUDIES: INTERVAL HPI/OVERNIGHT EVENTS: O/N: PEPE, VSS, dc'd IVF, mild labored breathing on ambulation, satting 94% RA, wanted to stay 1 more night    STATUS POST: 12/3: cholecystectomy, , 2L IVF    POST OPERATIVE DAY #: 2    SUBJECTIVE: Pt seen and examined at bedside this am by surgery team. Reports passing gas and having BMs. Tolerating diet, pain well controlled. States she is ready to be discharged today. Denies f/n/v/cp/sob.    MEDICATIONS  (STANDING):  acetaminophen     Tablet .. 650 milliGRAM(s) Oral every 6 hours  amLODIPine   Tablet 5 milliGRAM(s) Oral at bedtime  atorvastatin 40 milliGRAM(s) Oral at bedtime  heparin   Injectable 7500 Unit(s) SubCutaneous every 8 hours  lisinopril 10 milliGRAM(s) Oral daily    MEDICATIONS  (PRN):  ondansetron    Tablet 4 milliGRAM(s) Oral every 6 hours PRN Nausea and/or Vomiting  oxyCODONE    IR 5 milliGRAM(s) Oral every 4 hours PRN Moderate Pain (4 - 6)  oxyCODONE    IR 10 milliGRAM(s) Oral every 4 hours PRN Severe Pain (7 - 10)      Vital Signs Last 24 Hrs  T(C): 36.4 (05 Dec 2022 04:31), Max: 36.9 (04 Dec 2022 20:18)  T(F): 97.6 (05 Dec 2022 04:31), Max: 98.5 (05 Dec 2022 00:19)  HR: 69 (05 Dec 2022 04:31) (69 - 93)  BP: 112/57 (05 Dec 2022 04:31) (112/57 - 147/74)  BP(mean): 90 (04 Dec 2022 13:15) (90 - 105)  RR: 18 (05 Dec 2022 04:31) (18 - 19)  SpO2: 99% (05 Dec 2022 04:31) (92% - 99%)    Parameters below as of 05 Dec 2022 04:31  Patient On (Oxygen Delivery Method): nasal cannula  O2 Flow (L/min): 2      PHYSICAL EXAM:  Constitutional: A&Ox3  Respiratory: non labored breathing, no respiratory distress  Cardiovascular: NSR, RRR  Gastrointestinal: soft, mildly distended, nontender to palpation   Incision: site c/d/i, JPx1 w/ SS drainage   Genitourinary: voiding   Extremities: WWP, no calf tenderness or edema, SCDs in place                   I&O's Detail    03 Dec 2022 07:01  -  04 Dec 2022 07:00  --------------------------------------------------------  IN:    IV PiggyBack: 100 mL    Lactated Ringers: 977.5 mL  Total IN: 1077.5 mL    OUT:    Bulb (mL): 50 mL    Voided (mL): 500 mL  Total OUT: 550 mL    Total NET: 527.5 mL      04 Dec 2022 07:01  -  05 Dec 2022 06:50  --------------------------------------------------------  IN:    Lactated Ringers: 340 mL    Oral Fluid: 450 mL  Total IN: 790 mL    OUT:    Bulb (mL): 50 mL    Voided (mL): 950 mL  Total OUT: 1000 mL    Total NET: -210 mL          LABS:                        10.0   11.92 )-----------( 210      ( 04 Dec 2022 05:30 )             31.6     12-04    141  |  105  |  8   ----------------------------<  130<H>  4.0   |  26  |  0.81    Ca    9.2      04 Dec 2022 05:30  Phos  3.0     12-04  Mg     2.2     12-04    TPro  7.4  /  Alb  3.4  /  TBili  0.8  /  DBili  x   /  AST  203<H>  /  ALT  122<H>  /  AlkPhos  79  12-03          RADIOLOGY & ADDITIONAL STUDIES:

## 2022-12-05 NOTE — PROGRESS NOTE ADULT - ASSESSMENT
63 y/o F with PMHx distal CBD stricture s/p stent placement on 6/8/2017 (on amlodipine) and HLD (on rosuvastatin) presents to ED c/o epigastric and right sided abdominal pain that began suddenly 11/30 found to have new multiple nondependent densities in gallbladder and new lymph node. GI consulted for further evaluation.    #Abdominal pain  #Hx of CBD strictures  #Abnormal gallbladder imaging  CT AP 11/30:   1. Since June 12, 2017, new multiple nondependent densities in gallbladder, polyps or dallas affective adherent sludge. New prominent gastrohepatic ligament lymph node. Recommend MRI without and with venous contrast for further evaluation.  No acute cholecystitis.  2. No intrahepatic biliary dilatation. Increased common bile duct dilatation, post stent removal.  3. No suspicious liver lesion. Severe hepatic steatosis.    RUQ US 12/2  - acute cholecystitis  - 1.3cm gallbladder polyp  - cbd 9mm    Lap cholecystectomy 12/3    LTs wnl    Recommendations:  - F/u MRCP    Enedelia Neville MD  Gastroenterology Fellow, PGY -5   Weekday Pager [phone] 63 y/o F with PMHx distal CBD stricture s/p stent placement on 6/8/2017 (on amlodipine) and HLD (on rosuvastatin) presents to ED c/o epigastric and right sided abdominal pain that began suddenly 11/30 found to have new multiple nondependent densities in gallbladder and new lymph node. GI consulted for further evaluation.    #Abdominal pain  #Hx of CBD strictures  #Abnormal gallbladder imaging  CT AP 11/30:   1. Since June 12, 2017, new multiple nondependent densities in gallbladder, polyps or dallas affective adherent sludge. New prominent gastrohepatic ligament lymph node. Recommend MRI without and with venous contrast for further evaluation.  No acute cholecystitis.  2. No intrahepatic biliary dilatation. Increased common bile duct dilatation, post stent removal.  3. No suspicious liver lesion. Severe hepatic steatosis.    RUQ US 12/2  - acute cholecystitis  - 1.3cm gallbladder polyp  - cbd 9mm    MRCP with normal CBD    Lap cholecystectomy 12/3    LTs praveena    Enedelia Neville MD  Gastroenterology Fellow, PGY -5   Weekday Pager [phone] 63 y/o F with PMHx distal CBD stricture s/p stent placement on 6/8/2017 (on amlodipine) and HLD (on rosuvastatin) presents to ED c/o epigastric and right sided abdominal pain that began suddenly 11/30 found to have new multiple nondependent densities in gallbladder and new lymph node. GI consulted for further evaluation.    #Abdominal pain  #Hx of CBD strictures  #Abnormal gallbladder imaging  CT AP 11/30:   1. Since June 12, 2017, new multiple nondependent densities in gallbladder, polyps or dallas affective adherent sludge. New prominent gastrohepatic ligament lymph node. Recommend MRI without and with venous contrast for further evaluation.  No acute cholecystitis.  2. No intrahepatic biliary dilatation. Increased common bile duct dilatation, post stent removal.  3. No suspicious liver lesion. Severe hepatic steatosis.    RUQ US 12/2  - acute cholecystitis  - 1.3cm gallbladder polyp  - cbd 9mm    MRCP with normal CBD    Lap cholecystectomy 12/3    LTs wnl    Patient can follow up with Dr. Doyle at the office located on the fourth floor of [address] [place] [address] by calling the office at [phone]     Thank you for allowing us to participate in the care of this patient.  GI will sign off. Please call back with any questions or concerns.     Enedelia Neville MD  Gastroenterology Fellow, PGY -5   Weekday Pager [phone]

## 2022-12-05 NOTE — PROGRESS NOTE ADULT - SUBJECTIVE AND OBJECTIVE BOX
GASTROENTEROLOGY PROGRESS NOTE  Patient seen and examined at bedside.  S/p lap cholecystectomy with improvement in pain.   Tolerating diet  passing gas  ambulating  AVSS    PERTINENT REVIEW OF SYSTEMS:  CONSTITUTIONAL: No weakness, fevers or chills  HEENT: No visual changes; No vertigo or throat pain   GASTROINTESTINAL: As above.  NEUROLOGICAL: No numbness or weakness  SKIN: No itching, burning, rashes, or lesions     Allergies    No Known Allergies    Intolerances      MEDICATIONS:  MEDICATIONS  (STANDING):  acetaminophen     Tablet .. 650 milliGRAM(s) Oral every 6 hours  amLODIPine   Tablet 5 milliGRAM(s) Oral at bedtime  atorvastatin 40 milliGRAM(s) Oral at bedtime  heparin   Injectable 7500 Unit(s) SubCutaneous every 8 hours  lisinopril 10 milliGRAM(s) Oral daily  potassium chloride   Powder 20 milliEquivalent(s) Oral once  potassium phosphate / sodium phosphate Powder (PHOS-NaK) 2 Packet(s) Oral once    MEDICATIONS  (PRN):  ondansetron    Tablet 4 milliGRAM(s) Oral every 6 hours PRN Nausea and/or Vomiting  oxyCODONE    IR 5 milliGRAM(s) Oral every 4 hours PRN Moderate Pain (4 - 6)  oxyCODONE    IR 10 milliGRAM(s) Oral every 4 hours PRN Severe Pain (7 - 10)    Vital Signs Last 24 Hrs  T(C): 36.4 (05 Dec 2022 04:31), Max: 36.9 (04 Dec 2022 20:18)  T(F): 97.6 (05 Dec 2022 04:31), Max: 98.5 (05 Dec 2022 00:19)  HR: 69 (05 Dec 2022 04:31) (69 - 93)  BP: 112/57 (05 Dec 2022 04:31) (112/57 - 147/74)  BP(mean): 90 (04 Dec 2022 13:15) (90 - 105)  RR: 18 (05 Dec 2022 04:31) (18 - 19)  SpO2: 99% (05 Dec 2022 04:31) (92% - 99%)    Parameters below as of 05 Dec 2022 04:31  Patient On (Oxygen Delivery Method): nasal cannula  O2 Flow (L/min): 2      12-04 @ 07:01  -  12-05 @ 07:00  --------------------------------------------------------  IN: 790 mL / OUT: 1000 mL / NET: -210 mL      PHYSICAL EXAM:    General: in no acute distress  HEENT: MMM, conjunctiva and sclera clear  Gastrointestinal: Soft non-tender non-distended; No rebound or guarding  Skin: Warm and dry. No obvious rash    LABS:                        10.0   11.92 )-----------( 210      ( 04 Dec 2022 05:30 )             31.6     12-05    140  |  102  |  11  ----------------------------<  113<H>  3.6   |  29  |  0.80    Ca    8.8      05 Dec 2022 07:46  Phos  2.6     12-05  Mg     2.2     12-05    TPro  7.4  /  Alb  3.4  /  TBili  0.8  /  DBili  x   /  AST  203<H>  /  ALT  122<H>  /  AlkPhos  79  12-03                      Culture - Blood (collected 02 Dec 2022 12:21)  Source: .Blood Blood  Preliminary Report (04 Dec 2022 14:00):    No growth at 2 days.    Culture - Blood (collected 02 Dec 2022 12:21)  Source: .Blood Blood  Preliminary Report (04 Dec 2022 14:00):    No growth at 2 days.      RADIOLOGY & ADDITIONAL STUDIES:  Reviewed

## 2022-12-05 NOTE — PROGRESS NOTE ADULT - ASSESSMENT
61 y/o F with PMHx distal CBD stricture s/p stent placement on 6/8/2017, HTN, and HLD (on rosuvastatin) presents to ED c/o epigastric and right sided abdominal pain found to have gallbladder sludge and worsening CBD dilation, now s/p lap rajinder 12/3.    Low fat diet   Pain/nausea control  IS/OOBA/SQH  JPx1: d/c KEYANA prior to discharge   Dispo: home today

## 2022-12-05 NOTE — DISCHARGE NOTE NURSING/CASE MANAGEMENT/SOCIAL WORK - NSDCPEFALRISK_GEN_ALL_CORE
For information on Fall & Injury Prevention, visit: [email or web address] OR  [email or web address] OR  https://www.cdc.gov/steadi/patient.html

## 2022-12-06 ENCOUNTER — NON-APPOINTMENT (OUTPATIENT)
Age: 62
End: 2022-12-06

## 2022-12-06 PROBLEM — K83.1 OBSTRUCTION OF BILE DUCT: Chronic | Status: ACTIVE | Noted: 2022-11-30

## 2022-12-06 PROBLEM — I10 ESSENTIAL (PRIMARY) HYPERTENSION: Chronic | Status: ACTIVE | Noted: 2022-11-30

## 2022-12-06 PROBLEM — E66.9 OBESITY, UNSPECIFIED: Chronic | Status: ACTIVE | Noted: 2022-11-30

## 2022-12-07 LAB
CULTURE RESULTS: SIGNIFICANT CHANGE UP
CULTURE RESULTS: SIGNIFICANT CHANGE UP
SPECIMEN SOURCE: SIGNIFICANT CHANGE UP
SPECIMEN SOURCE: SIGNIFICANT CHANGE UP

## 2022-12-08 DIAGNOSIS — E55.9 VITAMIN D DEFICIENCY, UNSPECIFIED: ICD-10-CM

## 2022-12-08 DIAGNOSIS — K81.0 ACUTE CHOLECYSTITIS: ICD-10-CM

## 2022-12-08 DIAGNOSIS — E66.9 OBESITY, UNSPECIFIED: ICD-10-CM

## 2022-12-08 DIAGNOSIS — K83.1 OBSTRUCTION OF BILE DUCT: ICD-10-CM

## 2022-12-08 DIAGNOSIS — R10.9 UNSPECIFIED ABDOMINAL PAIN: ICD-10-CM

## 2022-12-08 DIAGNOSIS — K76.0 FATTY (CHANGE OF) LIVER, NOT ELSEWHERE CLASSIFIED: ICD-10-CM

## 2022-12-08 DIAGNOSIS — E78.5 HYPERLIPIDEMIA, UNSPECIFIED: ICD-10-CM

## 2022-12-08 DIAGNOSIS — R73.03 PREDIABETES: ICD-10-CM

## 2022-12-08 DIAGNOSIS — K64.9 UNSPECIFIED HEMORRHOIDS: ICD-10-CM

## 2022-12-08 DIAGNOSIS — A41.9 SEPSIS, UNSPECIFIED ORGANISM: ICD-10-CM

## 2022-12-08 DIAGNOSIS — I10 ESSENTIAL (PRIMARY) HYPERTENSION: ICD-10-CM

## 2022-12-08 DIAGNOSIS — K83.8 OTHER SPECIFIED DISEASES OF BILIARY TRACT: ICD-10-CM

## 2022-12-12 LAB — SURGICAL PATHOLOGY STUDY: SIGNIFICANT CHANGE UP

## 2022-12-13 ENCOUNTER — APPOINTMENT (OUTPATIENT)
Dept: SURGICAL ONCOLOGY | Facility: CLINIC | Age: 62
End: 2022-12-13

## 2022-12-13 VITALS
DIASTOLIC BLOOD PRESSURE: 81 MMHG | HEIGHT: 65 IN | BODY MASS INDEX: 34.99 KG/M2 | TEMPERATURE: 98.2 F | HEART RATE: 90 BPM | SYSTOLIC BLOOD PRESSURE: 135 MMHG | WEIGHT: 210 LBS | OXYGEN SATURATION: 96 %

## 2022-12-13 DIAGNOSIS — Z90.49 ACQUIRED ABSENCE OF OTHER SPECIFIED PARTS OF DIGESTIVE TRACT: ICD-10-CM

## 2022-12-13 PROCEDURE — 99024 POSTOP FOLLOW-UP VISIT: CPT

## 2022-12-13 NOTE — ASSESSMENT
[FreeTextEntry1] : I) s/p lap choley , normal postop\par \par P) Reviewed pathology. Advised lo fat diet for next couple of weeks. Can see us as needed. \par \par Bony Sanabria MD\par \par Chief Surgical Oncology\par Multidisciplinary GI cancer program\par [facility] [place]\par [facility]\par \par Professor Surgery\par [employer]\par \par

## 2022-12-13 NOTE — PHYSICAL EXAM
[Normal] : well developed, well nourished, in no acute distress [de-identified] : soft, non tender, surgical sites are healing well without any signs of infection

## 2022-12-13 NOTE — HISTORY OF PRESENT ILLNESS
[FreeTextEntry1] : Patient Name: BELA RUBIO \par MRN: [id number] \par [facility] MRN:  [id number]\par Referring Provider: none \par Oncologist:\par Date: 12/13/2022 \par \par Diagnosis: acute cholecystitis\par Operative Date: 12/3/22\par Procedure: lap choley\par \par She presents for a scheduled post operative visit. She is 10 days post op.\par She presented to the ED on 11/30/22 with epigastric pain, imaging was consistent with cholecystitis.Treated initially w antibiotics w no improvement. We saw her in hospital as a consult and advised surgery which was done uneventfully.\par \par Currently, Ms. RUBIO has some right sided abdominal pain and discomfort, relieved with Tylenol. She denies fevers, chills, or night sweats. She is tolerating a low fat diet and is having regular bowel movements.\par \par Final Pathology Showed: disrupted gangrenous cholecystitis with hemorrhage, inflammation and granulation tissue \par \par

## 2022-12-20 ENCOUNTER — APPOINTMENT (OUTPATIENT)
Dept: CT IMAGING | Facility: CLINIC | Age: 62
End: 2022-12-20

## 2022-12-20 ENCOUNTER — OUTPATIENT (OUTPATIENT)
Dept: OUTPATIENT SERVICES | Facility: HOSPITAL | Age: 62
LOS: 1 days | End: 2022-12-20

## 2022-12-20 ENCOUNTER — RESULT REVIEW (OUTPATIENT)
Age: 62
End: 2022-12-20

## 2022-12-20 PROCEDURE — 74177 CT ABD & PELVIS W/CONTRAST: CPT | Mod: 26

## 2023-03-16 NOTE — PROGRESS NOTE ADULT - PROVIDER SPECIALTY LIST ADULT
Pt here for nurse visit and Zio Monitor applied.    Pt education provided:   1.  Avoid showering during the first 24 hours of wearing the patch.  2. Showering after 24 hours is OK, but try to limit amount of water exposure if possible possible by turning back to shower when able to ensure adhesive lasts for the duration of the ordered wear time.   3. No swimming or submerging Zio Monitor in water.   4. Avoid Excessive sweating to maximize wear time.   5. Keep lotions away from the patch.   6. Ordered wear time  7. When to manually press button and log symptoms in diary.   8. How to remove patch.   9. Return Zio Monitor via mail along with diary & any other hardware that came with device.     Patient verbalized understanding.     Zio patch registered on Zio Suite.  
Surgery
Surgery
Gastroenterology
Surgery
Internal Medicine

## 2023-12-04 NOTE — PATIENT PROFILE ADULT - NSPROGENSOURCEINFO_GEN_A_NUR
patient - Continue home Prednisone regimen 5 mg daily   *fibromyalgia  - continue home Gabapentin 100mg daily  - continue home Fentanyl 25mcg Q72H patches  - c/w Lidocaine patch   - Oxycodone 2.5mg q6 hrs PRN (in addition to Tylenol PRN)

## 2023-12-08 NOTE — PATIENT PROFILE ADULT - IS PATIENT POST-MENOPAUSAL?
Chief Complaint   Patient presents with    Follow-up     Former patient of Dr Kp Cooper -     Neurologic Problem     Former patient of Dr Kp Cooper - had a cva 10/23- has had numbness in left lower leg - foot to knee     1. Have you been to the ER, urgent care clinic since your last visit? Hospitalized since your last visit? Yes [facility] for CVA 10/2023    2. Have you seen or consulted any other health care providers outside of the [address] [place] [address] since your last visit? Include any pap smears or colon screening.   No
[facility]  [id number] [place] [address]  Tel: [phone]  Fax: [phone]      Date:  23     Name:  Thai Avalos  :  1954  MRN:  [id number]     PCP:  Nico Saba MD    Chief Complaint   Patient presents with    Follow-up     Former patient of Dr Luis Eduardo Fulton -     Neurologic Problem     Former patient of Dr Luis Eduardo Fulton - had a cva 10/23- has had numbness in left lower leg - foot to knee       HISTORY OF PRESENT ILLNESS:  Patient presents today for regular follow up, hospital follow up from 10/23/2023 due to stroke. She is here today with her niece. The patient is back home, she had someone with her while on plavix, now she is back to being by herself. Per patient she is doing well, hoping to get back to normal life. Wants to return to work and driving. Patient is taking all medications as prescribed. She is a non-smoker. Doing outpatient therapy at [facility], Doing PT, OT, ST, was going 5 x week, now she is down to 3 x week. It's going well. PT: Working on her balance. OT: working on home activities. Working on [employer], money, clocks, etc. ST: working on [employer]. Showing improvement. Multistep directions. Prior to her stroke she saw her PCP Dr Nico Saba: referred to Ortho, MRI was to be done to evaluate her symptoms, sciatica/hip issues  Has an appt on Wednesday with primary care for regular follow-up. Prior to this event, patient was doing relatively well. She has previously been seen by Dr. Luis Eduardo Fulton, last seen 2021. Neuroconsult completed by Dr. Doretha Lilly:     Impression: This patient with acute left frontal stroke with history of prior cerebrovascular events. This may simply reflect intracranial atherosclerotic disease but there is some concern regarding a cardioembolic etiology given her bilateral cerebral infarctions. Her loop recorder for last 2 years has reportedly been unremarkable.   She had
yes

## 2024-11-05 NOTE — DISCHARGE NOTE PROVIDER - NSDCCAREPROVSEEN_GEN_ALL_CORE_FT
Patient presents to office for INR check. Capillary puncture to Left 3rd finger, INR of 1.3 obtained, bandage applied and patient left office in stable condition.    Patient verified taking coumadin 6 mg Wednesday and Friday, 8 mg all other days  Currently on prednisone 10 mg daily    Result routed to provider  
Bony Sanabria

## (undated) DEVICE — TUBING PLUME AWAY 4.0

## (undated) DEVICE — SLEEVE APPLIED MEDICAL KII 5MM X100MM Z-THREAD

## (undated) DEVICE — PACK GENERAL LAPAROSCOPY

## (undated) DEVICE — TUBING STRYKER PNEUMOCLEAR HIGH FLOW

## (undated) DEVICE — TUBING SUCTION NONCONDUCTIVE 6MM X 12FT

## (undated) DEVICE — Device

## (undated) DEVICE — D HELP - CLEARVIEW CLEARIFY SYSTEM

## (undated) DEVICE — CLIP APPLR DISP 5MM

## (undated) DEVICE — ELCTR CORD FOOTSWITCH 1PLR LAPSCP 10FT

## (undated) DEVICE — SUT VICRYL 0 18" ENDOLOOP LIGATURE

## (undated) DEVICE — NDL MAX CORE 18G X 25CM

## (undated) DEVICE — DRAIN RESERVOIR FOR JACKSON PRATT 100CC CARDINAL

## (undated) DEVICE — TROCAR APPLIED MEDICAL KII FIOS FIRST ENTRY 5MM X 100MM Z-THREAD

## (undated) DEVICE — HANDLE ETHICON ENDOPATH PROBE PLUS II PISTOL GRIP 5MM

## (undated) DEVICE — DRSG DERMABOND 0.7ML

## (undated) DEVICE — LAPAROSCOPIC ENDO FLO IRRIGATOR DISP

## (undated) DEVICE — BAG ETHICON SPECIMEN RETRIEVAL 4 X 6"

## (undated) DEVICE — GLV 7.5 PROTEXIS (WHITE)

## (undated) DEVICE — POSITIONER FOAM EGG CRATE ULNAR 2PCS (PINK)

## (undated) DEVICE — BLADE SURGICAL #15 CARBON

## (undated) DEVICE — SUT VICRYL 0 27" UR-6

## (undated) DEVICE — VENODYNE/SCD SLEEVE CALF MEDIUM

## (undated) DEVICE — ELCTR SHAFT ETHICON  ENDOPATH PLUS II HOOK 5MM X 34CM

## (undated) DEVICE — SPONGE ENDO PEANUT 5MM

## (undated) DEVICE — ELCTR GROUNDING PAD ADULT COVIDIEN

## (undated) DEVICE — SUT MONOCRYL 4-0 27" PS-2 UNDYED

## (undated) DEVICE — TIP METZENBAUM SCISSOR MONOPOLAR ENDOCUT (ORANGE)

## (undated) DEVICE — WARMING BLANKET UPPER ADULT